# Patient Record
Sex: FEMALE | Race: WHITE | NOT HISPANIC OR LATINO | Employment: FULL TIME | URBAN - METROPOLITAN AREA
[De-identification: names, ages, dates, MRNs, and addresses within clinical notes are randomized per-mention and may not be internally consistent; named-entity substitution may affect disease eponyms.]

---

## 2023-05-04 LAB
CALCIDIOL (25 OH VITAMIN D3) (NG/ML) IN SER/PLAS: 37 NG/ML
ERYTHROCYTE DISTRIBUTION WIDTH (RATIO) BY AUTOMATED COUNT: 13.5 % (ref 11.5–14.5)
ERYTHROCYTE MEAN CORPUSCULAR HEMOGLOBIN CONCENTRATION (G/DL) BY AUTOMATED: 32.1 G/DL (ref 32–36)
ERYTHROCYTE MEAN CORPUSCULAR VOLUME (FL) BY AUTOMATED COUNT: 94 FL (ref 80–100)
ERYTHROCYTES (10*6/UL) IN BLOOD BY AUTOMATED COUNT: 4.03 X10E12/L (ref 4–5.2)
GLUCOSE, 1 HR SCREEN, PREG: 68 MG/DL
HEMATOCRIT (%) IN BLOOD BY AUTOMATED COUNT: 37.7 % (ref 36–46)
HEMOGLOBIN (G/DL) IN BLOOD: 12.1 G/DL (ref 12–16)
LEUKOCYTES (10*3/UL) IN BLOOD BY AUTOMATED COUNT: 10.5 X10E9/L (ref 4.4–11.3)
NRBC (PER 100 WBCS) BY AUTOMATED COUNT: 0 /100 WBC (ref 0–0)
PLATELETS (10*3/UL) IN BLOOD AUTOMATED COUNT: 209 X10E9/L (ref 150–450)
REFLEX ADDED, ANEMIA PANEL: NORMAL
THYROTROPIN (MIU/L) IN SER/PLAS BY DETECTION LIMIT <= 0.05 MIU/L: 0.78 MIU/L (ref 0.44–3.98)

## 2023-07-01 LAB — GROUP B STREP SCREEN: NORMAL

## 2023-11-09 PROBLEM — R01.1 MURMUR: Status: ACTIVE | Noted: 2023-11-09

## 2023-11-09 PROBLEM — D18.01 HEMANGIOMA OF SKIN AND SUBCUTANEOUS TISSUE: Status: ACTIVE | Noted: 2022-10-17

## 2023-11-09 PROBLEM — B00.1 COLD SORE: Status: ACTIVE | Noted: 2023-11-09

## 2023-11-09 PROBLEM — L70.0 ACNE VULGARIS: Status: ACTIVE | Noted: 2022-10-17

## 2023-11-09 PROBLEM — L81.4 OTHER MELANIN HYPERPIGMENTATION: Status: ACTIVE | Noted: 2022-10-17

## 2023-11-09 PROBLEM — D22.5 MELANOCYTIC NEVI OF TRUNK: Status: ACTIVE | Noted: 2022-10-17

## 2023-11-09 PROBLEM — L82.1 OTHER SEBORRHEIC KERATOSIS: Status: ACTIVE | Noted: 2022-10-17

## 2023-11-10 ENCOUNTER — DOCUMENTATION (OUTPATIENT)
Dept: PEDIATRIC ICU | Facility: HOSPITAL | Age: 28
End: 2023-11-10
Payer: COMMERCIAL

## 2023-11-15 ENCOUNTER — OFFICE VISIT (OUTPATIENT)
Dept: OBSTETRICS AND GYNECOLOGY | Facility: CLINIC | Age: 28
End: 2023-11-15
Payer: COMMERCIAL

## 2023-11-15 VITALS
BODY MASS INDEX: 30.82 KG/M2 | SYSTOLIC BLOOD PRESSURE: 126 MMHG | WEIGHT: 185 LBS | HEIGHT: 65 IN | DIASTOLIC BLOOD PRESSURE: 74 MMHG

## 2023-11-15 DIAGNOSIS — Z01.419 ENCOUNTER FOR GYNECOLOGICAL EXAMINATION WITHOUT ABNORMAL FINDING: Primary | ICD-10-CM

## 2023-11-15 PROCEDURE — 1036F TOBACCO NON-USER: CPT | Performed by: OBSTETRICS & GYNECOLOGY

## 2023-11-15 PROCEDURE — 99395 PREV VISIT EST AGE 18-39: CPT | Performed by: OBSTETRICS & GYNECOLOGY

## 2023-11-15 NOTE — PROGRESS NOTES
"Subjective   Sunitha Mckeon is a 28 y.o. female here for a routine exam. Current complaints: She is breast-feeding, she has had 2 menses.  They were slightly heavier.  There is no discharge, no change in bowel habits or dysuria.  She is sexually active and does occasionally notice a \"cramping\" at the cervical area.  She had a vaginal delivery boy on August 1, 2023 at [facility].. Personal health questionnaire reviewed: not asked.     Gynecologic History  Patient's last menstrual period was 11/06/2023 (exact date).  Contraception: condoms  Last Pap: 5/5/21. Results were: normal  Last mammogram: n/a. Results were:  n/a    Obstetric History  OB History   No obstetric history on file.       Objective   Constitutional: Alert and in no acute distress. Well developed, well nourished.   Head and Face: Head and face: Normal.    Eyes: Normal external exam - nonicteric sclera, extraocular movements intact (EOMI) and no ptosis.   Neck: No neck asymmetry. Supple. Thyroid not enlarged and there were no palpable thyroid nodules.    Pulmonary: No respiratory distress.   Chest: Breasts: Normal appearance, no nipple discharge and no skin changes. Palpation of breasts and axillae: No palpable mass and no axillary lymphadenopathy.   Abdomen: Soft nontender; no abdominal mass palpated. No organomegaly. No hernias.   Genitourinary: External genitalia: Normal. No inguinal lymphadenopathy. Bartholin's Urethral and Skenes Glands: Normal. Urethra: Normal.  Bladder: Normal on palpation. Vagina: Normal. Cervix: Normal.  No pap was sent. Uterus: Normal.  Right Adnexa/parametria: Normal.  Left Adnexa/parametria: Normal.  Inspection of Perianal Area: Normal.   Musculoskeletal: No joint swelling seen, normal movements of all extremities.   Skin: Normal skin color and pigmentation, normal skin turgor, and no rash.   Neurologic: Non-focal. Grossly intact.   Psychiatric: Alert and oriented x 3. Affect normal to patient baseline. Mood: Appropriate.  " Physical Exam     Assessment/Plan   Healthy female exam.  This is a 28-year-old female with a normal exam.  No Pap was sent, she had a normal Pap in 2021.  We discussed options for contraception in the setting of breast-feeding.  She will continue to use over-the-counter contraceptive gels.  We discussed options such as progestin only pills or IUD.  I will see her routinely in 1 year.  Education reviewed: self breast exams.

## 2024-08-20 ENCOUNTER — APPOINTMENT (OUTPATIENT)
Dept: DERMATOLOGY | Facility: CLINIC | Age: 29
End: 2024-08-20
Payer: COMMERCIAL

## 2024-08-20 DIAGNOSIS — D22.9 NEVUS: ICD-10-CM

## 2024-08-20 DIAGNOSIS — Z12.83 SKIN CANCER SCREENING: Primary | ICD-10-CM

## 2024-08-20 DIAGNOSIS — L70.0 ACNE VULGARIS: ICD-10-CM

## 2024-08-20 DIAGNOSIS — L81.4 LENTIGO: ICD-10-CM

## 2024-08-20 DIAGNOSIS — L23.89 OTHER ALLERGIC CONTACT DERMATITIS: ICD-10-CM

## 2024-08-20 PROCEDURE — 1036F TOBACCO NON-USER: CPT | Performed by: NURSE PRACTITIONER

## 2024-08-20 PROCEDURE — 99214 OFFICE O/P EST MOD 30 MIN: CPT | Performed by: NURSE PRACTITIONER

## 2024-08-20 RX ORDER — TRETINOIN 0.5 MG/G
CREAM TOPICAL NIGHTLY
Qty: 20 G | Refills: 1 | Status: SHIPPED | OUTPATIENT
Start: 2024-08-20 | End: 2025-08-20

## 2024-08-20 NOTE — PROGRESS NOTES
Subjective     Sunitha Mckeon is a 28 y.o. female who presents for the following: Skin Check and Acne.   Established patient in for annual full-body skin check as well as annual follow-up for acne patient was last prescribed tretinoin 0.05% cream.    Review of Systems:  No other skin or systemic complaints other than what is documented elsewhere in the note.    The following portions of the chart were reviewed this encounter and updated as appropriate:       Skin Cancer History  No skin cancer on file.    Specialty Problems          Dermatology Problems    Acne vulgaris    Hemangioma of skin and subcutaneous tissue    Melanocytic nevi of trunk    Other melanin hyperpigmentation    Other seborrheic keratosis     Past Medical History:  Sunitha Mckeon  has no past medical history on file.    Past Surgical History:  Sunitha Mckeon  has no past surgical history on file.    Family History:  Patient family history is not on file.    Social History:  Sunitha Mckeon  reports that she has never smoked. She has never used smokeless tobacco. She reports that she does not currently use alcohol. She reports that she does not use drugs.    Allergies:  Patient has no known allergies.    Current Medications / CAM's:    Current Outpatient Medications:     prenatal vit 91/iron/folic/dha (PRENATAL + DHA ORAL), Take by mouth., Disp: , Rfl:     tretinoin (Retin-A) 0.05 % cream, Apply topically once daily at bedtime. Apply a pea size amount nightly to entire face. Moisturize as needed, Disp: 20 g, Rfl: 1     Objective   Well appearing patient in no apparent distress; mood and affect are within normal limits.      Assessment/Plan   1. Skin cancer screening    The patient presented for a routine skin examination today. There are no specific concerns regarding skin health and no new or changing moles, lesions, or rashes.     Assessment: Based on the comprehensive skin examination, there were no concerning or abnormal  findings. The patient's skin appeared to be in good health, without any notable dermatologic conditions or lesions.    Plan: Given the absence of any significant skin findings, no specific interventions or treatments are warranted at this time. The patient was educated on the importance of regular skin self-examinations and advised to promptly report any changes or concerns. Routine follow-up for a skin examination was recommended.    -These lesions have benign, reassuring patterns on dermoscopy.  -There were no concerning features found on exam today.  -Recommend continued self-observation, and to contact the office if any changes in nevi are  noticed.    Discussed/information given on safe sun practices and use of sunscreen, sun protective clothing or sun avoidance. Recommend to use OTC medication of sunscreen SPF 30 or higher on a daily basis prior to sun exposure to reduce the risk of skin cancer.    Contact Office if: Any lesions change in size, shape or color; itch, bum or bleed.         2. Nevus  Multiple benign appearing flesh colored to pigmented macules and papules     Plan: Counseling.  I counseled the patient regarding the following:  Instructions: Monthly self-skin checks to monitor for any changes in moles are recommended. Expectations: Benign Nevi are pigmented nests of cells within the skin.No treatment is necessary. Contact Office if: Any moles change in size, shape or color; itch, bum or bleed.    3. Lentigo  Scattered tan macules in sun-exposed areas.    Solar lentigo (a type of lentigo also known as a senile lentigo, age spot, or liver spot) is a benign pigmented macule appearing on fair-skinned individuals that is related to ultraviolet radiation (UVR) exposure, typically from the sun.     PLAN:  Limiting sun exposure through avoidance, protective clothing, and use of sunscreens can help prevent the appearance of solar lentigines.    If lesion changes or becomes symptomatic she should return to  clinic    4. Acne vulgaris  Scattered comedones and inflammatory papulopustules.    Maintenance of Current Regimen: Please continue using the prescribed topical medications as directed. Consistency is key to maintaining the improvement achieved so far.    PLAN:  Restart tretinoin 0.05% cream nightly   Patient was very happy with regimen prior to her pregnancy      Further Treatment Options: If there are no significant improvements or if your acne worsens, we may consider additional treatment options such as oral medications or in-office procedures. However, it is important to note that most cases of mild acne can be managed effectively with the current regimen.    Skin Care Tips: Continue following a gentle cleansing routine, avoiding harsh scrubbing, and using non-comedogenic moisturizers to keep your skin hydrated without clogging pores.    Cleansing Routine: Gentle Cleanser: You have been using a mild, non-comedogenic cleanser to wash your face twice daily. This practice helps to remove excess oil, dirt, and impurities from your skin, minimizing the risk of pore blockage.    Lifestyle Measures: Avoiding Trigger Factors: We also discussed avoiding known triggers such as excessive sun exposure, touching or picking at the acne lesions, and using heavy or comedogenic cosmetic products.    Please feel free to contact our clinic if you have any questions or concerns between appointments. Remember, acne treatment requires patience and consistency.                 tretinoin (Retin-A) 0.05 % cream  Apply topically once daily at bedtime. Apply a pea size amount nightly to entire face. Moisturize as needed    5. Other allergic contact dermatitis  Left Proximal 4th Finger  Eczematous, blistering dermatitis when patient wears her wedding ring.  She was treated with clotrimazole/betamethasone with good results    PLAN:  Discussed the nature of contact dermatitis and that exposure can reproduce rash  Pictures on patients phone  support the diagnosis, but nothing active on physical exam  Reassurance this does not appear fungal

## 2024-09-12 ENCOUNTER — APPOINTMENT (OUTPATIENT)
Dept: PRIMARY CARE | Facility: CLINIC | Age: 29
End: 2024-09-12
Payer: COMMERCIAL

## 2024-09-12 VITALS
BODY MASS INDEX: 29.82 KG/M2 | TEMPERATURE: 96.9 F | HEART RATE: 66 BPM | WEIGHT: 179 LBS | SYSTOLIC BLOOD PRESSURE: 120 MMHG | HEIGHT: 65 IN | OXYGEN SATURATION: 98 % | DIASTOLIC BLOOD PRESSURE: 64 MMHG

## 2024-09-12 DIAGNOSIS — Z00.00 ANNUAL PHYSICAL EXAM: Primary | ICD-10-CM

## 2024-09-12 PROCEDURE — 99385 PREV VISIT NEW AGE 18-39: CPT | Performed by: NURSE PRACTITIONER

## 2024-09-12 PROCEDURE — 1036F TOBACCO NON-USER: CPT | Performed by: NURSE PRACTITIONER

## 2024-09-12 PROCEDURE — 3008F BODY MASS INDEX DOCD: CPT | Performed by: NURSE PRACTITIONER

## 2024-09-12 RX ORDER — CLOTRIMAZOLE AND BETAMETHASONE DIPROPIONATE 10; .64 MG/G; MG/G
CREAM TOPICAL
COMMUNITY
Start: 2024-07-01

## 2024-09-12 ASSESSMENT — ENCOUNTER SYMPTOMS
DEPRESSION: 0
OCCASIONAL FEELINGS OF UNSTEADINESS: 0
LOSS OF SENSATION IN FEET: 0

## 2024-09-12 ASSESSMENT — PATIENT HEALTH QUESTIONNAIRE - PHQ9
1. LITTLE INTEREST OR PLEASURE IN DOING THINGS: NOT AT ALL
SUM OF ALL RESPONSES TO PHQ9 QUESTIONS 1 AND 2: 0
2. FEELING DOWN, DEPRESSED OR HOPELESS: NOT AT ALL

## 2024-09-12 ASSESSMENT — PAIN SCALES - GENERAL: PAINLEVEL: 0-NO PAIN

## 2024-09-12 NOTE — PATIENT INSTRUCTIONS
Return for Fasting Labs  Nothing to eat or drink for 10 hours. Black coffee, black tea or water is ok    Health Tips for People in their 20's     Develop good health habits now  Don't put it off. With good health habits, you can prevent or reduce the likelihood of health-impacting conditions later in life, such as obesity, high blood pressure, heart disease, or diabetes. Establishing good health habits now is easier than having to begin these practices later on, or to have to break bad habits.      Establish a relationship with a primary care provider   A PCP can help you on your health journey. When you see a provider on a regular basis, you're more likely to feel comfortable about talking about sensitive issues as well as being receptive to the advice your provider offers, because you develop a trusting relationship. And by getting to know you over time, your doctor may be better able to  on signs of health concerns.      Know your family health history   Knowing your family's health history can help you and your primary care provider better manage your health - and be aware of potential hereditary risks to be watching for.  Things like migraines or even family members with certain cancers and heart attack can increase your risk.       Get regular health screenings and Keep up with immunizations. This includes the HPV vaccine, for men and women.   For women: Monthly Self breast exams, See Gynecology for a Pap smear; HPV screening for the virus that causes genital warts, which can lead to cervical cancer   For men: Monthly Self testicular exams.   For both: sexually transmitted disease testing, if sexually active. Remember to use birth control to prevent and to protect. Talk with your health care provider about the screening schedule that's best for you.    Have good for you people in your life  Its all about a few good friends over a lot of not so good friends. If you are close to your family stay that way,  if not then develop new relationships that help you to grow and thrive. Often people make friends at work, [employer], community groups  Developing and maintaining a work-life balance that allows room for friendships and relationships can have a positive impact on your mental and emotional health.     Be a numbers person  Keep tabs on numbers that affect your health, like weight, blood pressure, the amount of calories you consume, and cholesterol. Your health care provider can help you with this.      Pay attention to the risk of a few extra pounds.  If you gain four or five pounds every year, it doesn't seem like a lot, but at the end of 10 years, you've added 40 or 50 pounds - and in 15 to 20 years, you have 75 to 100 extra pounds that you're carrying!  *Choose a life of healthful eating over trendy diets. The more effective approach: adopt a life-time practice of eating a balanced, nutritional diet that includes vegetables, fruits, lean meats, whole grains, low-fat dairy, nuts and legumes, and non-tropical vegetable oils. And limit sweets.   *Practice portion control. There's more to healthy eating than choosing nutritious food. There's also limiting how much you eat, even if you're eating incredibly healthy food *Remember, your metabolic rate slows as you age. That is, your body becomes less efficient in burning calories.     Stay active   If you're exercising on a regular basis, that is going to help with a lot of health problems that are related to lifestyle.  You don't have to be an athlete, start with a walking program. Even 10 minutes of good exercise is beneficial. Don't forget weight training. Any Weights 3 days a week maintains bone health and helps to burn calories    Get enough sleep  For most that means seven to nine hours a night.   Sleep affects your ability to learn new information and to memorize and process information. Reaction time is adversely affected by too little sleep (a big safety risk  similar to drinking alcohol). In the long run, sleep makes a big difference in how you function and succeed     Mood impacts your overall health  People who are struggling with depression, anxiety, and self-esteem issues really have a lot of difficulty with their health. When depressed, you may not be motivated and may not see the value of taking care of your health. Self Care, Exercise and friendships can help reduce your risk of mental and emotional health issues, and when you need it, your health care provider can help you get professional help.      Don't vape  Vaping is a big problem ,it's not just flavored water, nicotine comes from tobacco so you are in essence still smoking. Also, we don't know about the effects of what's in vaping cartridges, and sometimes they're modified with substances that can cause lung failure. Cigarettes are even worse with known cancer causing chemicals  2 ml of vape juice is equal to 1 pack of cigarettes    Think twice about marijuana  Even in states where marijuana is legal (medically or recreationally) it doesn't mean your employer is going to think it's OK.   Like alcohol, marijuana affects your reasoning, decision-making, and ability to operate equipment safely

## 2024-09-12 NOTE — PROGRESS NOTES
"Subjective   Patient ID: Sunitha Mckeon is a 28 y.o. female who presents for New Patient Visit, Annual Exam, and Dry Eye.    HPI  Pleasant 27 y/o female with PMH Heart Murmur who presents to [facility] care and for Annual  No PCP    Current concerns  Dry eye- over the last year, denies allergy, itchy watery eyes, ears, PND. Was evaluated by Optho who recommended lubricating drops.      Works as RN rotating nights at [employer]  2 Kids ages 1 and 2 1/2    Diet recently started eating healthier, counting calories  Caffeine 2  Water 64 oz  Exercise walks     Non-smoker  Alcohol Social       Eye exam 2024  Dental exam 2022, scheduled  Gyn no abnormal pap 2023    Family history non contributory      Review of Systems  Review of Systems   Constitutional: Negative.    HENT: Negative.     Respiratory: Negative.     Cardiovascular: Negative.    Gastrointestinal: Negative.    Genitourinary: Negative.    Musculoskeletal: Negative.    Psychiatric/Behavioral: Negative.     All other systems reviewed and are negative.    .vsVisit Vitals  /64 (BP Location: Left arm, Patient Position: Sitting)   Pulse 66   Temp 36.1 °C (96.9 °F)   Ht 1.651 m (5' 5\")   Wt 81.2 kg (179 lb)   LMP 09/01/2024   SpO2 98%   BMI 29.79 kg/m²   OB Status Having periods   Smoking Status Never   BSA 1.93 m²         Objective   Physical Exam  Vitals reviewed.   Constitutional:       Appearance: Normal appearance.   HENT:      Head: Normocephalic and atraumatic.      Right Ear: Tympanic membrane and ear canal normal.      Left Ear: Tympanic membrane and ear canal normal.      Nose: Rhinorrhea present.      Mouth/Throat:      Pharynx: Oropharynx is clear.   Eyes:      General:         Right eye: No discharge.         Left eye: No discharge.      Extraocular Movements: Extraocular movements intact.      Conjunctiva/sclera: Conjunctivae normal.      Pupils: Pupils are equal, round, and reactive to light.   Cardiovascular:      Rate and Rhythm: Normal " rate and regular rhythm.      Pulses: Normal pulses.      Heart sounds: Normal heart sounds.   Pulmonary:      Effort: Pulmonary effort is normal.      Breath sounds: Normal breath sounds. No wheezing.   Abdominal:      General: Bowel sounds are normal. There is no distension.      Palpations: Abdomen is soft.      Tenderness: There is no abdominal tenderness.   Musculoskeletal:         General: Normal range of motion.      Cervical back: Normal range of motion and neck supple.   Skin:     General: Skin is warm.      Capillary Refill: Capillary refill takes 2 to 3 seconds.   Neurological:      General: No focal deficit present.      Mental Status: She is alert.   Psychiatric:         Mood and Affect: Mood normal.         Assessment/Plan   Problem List Items Addressed This Visit       Annual physical exam - Primary     Plate method, Nutritionist discussed           Relevant Orders    CBC    Comprehensive Metabolic Panel    Vitamin D 25-Hydroxy,Total (for eval of Vitamin D levels)    TSH with reflex to Free T4 if abnormal    Hemoglobin A1C    Lipid Panel

## 2024-09-13 ENCOUNTER — LAB (OUTPATIENT)
Dept: LAB | Facility: LAB | Age: 29
End: 2024-09-13
Payer: COMMERCIAL

## 2024-09-13 LAB
ERYTHROCYTE [DISTWIDTH] IN BLOOD BY AUTOMATED COUNT: 12.3 % (ref 11.5–14.5)
EST. AVERAGE GLUCOSE BLD GHB EST-MCNC: 97 MG/DL
HBA1C MFR BLD: 5 %
HCT VFR BLD AUTO: 38.9 % (ref 36–46)
HGB BLD-MCNC: 12.7 G/DL (ref 12–16)
MCH RBC QN AUTO: 30.2 PG (ref 26–34)
MCHC RBC AUTO-ENTMCNC: 32.6 G/DL (ref 32–36)
MCV RBC AUTO: 92 FL (ref 80–100)
NRBC BLD-RTO: 0 /100 WBCS (ref 0–0)
PLATELET # BLD AUTO: 224 X10*3/UL (ref 150–450)
RBC # BLD AUTO: 4.21 X10*6/UL (ref 4–5.2)
WBC # BLD AUTO: 7.8 X10*3/UL (ref 4.4–11.3)

## 2024-09-13 PROCEDURE — 82306 VITAMIN D 25 HYDROXY: CPT

## 2024-09-13 PROCEDURE — 80061 LIPID PANEL: CPT

## 2024-09-13 PROCEDURE — 84443 ASSAY THYROID STIM HORMONE: CPT

## 2024-09-13 PROCEDURE — 80053 COMPREHEN METABOLIC PANEL: CPT

## 2024-09-13 PROCEDURE — 85027 COMPLETE CBC AUTOMATED: CPT

## 2024-09-13 PROCEDURE — 83036 HEMOGLOBIN GLYCOSYLATED A1C: CPT

## 2024-09-14 LAB
25(OH)D3 SERPL-MCNC: 34 NG/ML (ref 30–100)
ALBUMIN SERPL BCP-MCNC: 4.5 G/DL (ref 3.4–5)
ALP SERPL-CCNC: 52 U/L (ref 33–110)
ALT SERPL W P-5'-P-CCNC: 11 U/L (ref 7–45)
ANION GAP SERPL CALC-SCNC: 13 MMOL/L (ref 10–20)
AST SERPL W P-5'-P-CCNC: 11 U/L (ref 9–39)
BILIRUB SERPL-MCNC: 0.8 MG/DL (ref 0–1.2)
BUN SERPL-MCNC: 11 MG/DL (ref 6–23)
CALCIUM SERPL-MCNC: 9.3 MG/DL (ref 8.6–10.6)
CHLORIDE SERPL-SCNC: 102 MMOL/L (ref 98–107)
CHOLEST SERPL-MCNC: 147 MG/DL (ref 0–199)
CHOLESTEROL/HDL RATIO: 2.9
CO2 SERPL-SCNC: 25 MMOL/L (ref 21–32)
CREAT SERPL-MCNC: 0.83 MG/DL (ref 0.5–1.05)
EGFRCR SERPLBLD CKD-EPI 2021: >90 ML/MIN/1.73M*2
GLUCOSE SERPL-MCNC: 88 MG/DL (ref 74–99)
HDLC SERPL-MCNC: 50.1 MG/DL
LDLC SERPL CALC-MCNC: 78 MG/DL
NON HDL CHOLESTEROL: 97 MG/DL (ref 0–149)
POTASSIUM SERPL-SCNC: 4 MMOL/L (ref 3.5–5.3)
PROT SERPL-MCNC: 7 G/DL (ref 6.4–8.2)
SODIUM SERPL-SCNC: 136 MMOL/L (ref 136–145)
TRIGL SERPL-MCNC: 93 MG/DL (ref 0–149)
TSH SERPL-ACNC: 1 MIU/L (ref 0.44–3.98)
VLDL: 19 MG/DL (ref 0–40)

## 2024-10-09 ENCOUNTER — LAB (OUTPATIENT)
Dept: LAB | Facility: LAB | Age: 29
End: 2024-10-09
Payer: COMMERCIAL

## 2024-10-09 ENCOUNTER — TELEPHONE (OUTPATIENT)
Dept: OBSTETRICS AND GYNECOLOGY | Facility: CLINIC | Age: 29
End: 2024-10-09
Payer: COMMERCIAL

## 2024-10-09 DIAGNOSIS — R39.9 UTI SYMPTOMS: Primary | ICD-10-CM

## 2024-10-09 DIAGNOSIS — R39.9 UTI SYMPTOMS: ICD-10-CM

## 2024-10-09 LAB
APPEARANCE UR: CLEAR
BILIRUB UR STRIP.AUTO-MCNC: NEGATIVE MG/DL
COLOR UR: YELLOW
GLUCOSE UR STRIP.AUTO-MCNC: NORMAL MG/DL
KETONES UR STRIP.AUTO-MCNC: NEGATIVE MG/DL
LEUKOCYTE ESTERASE UR QL STRIP.AUTO: ABNORMAL
MUCOUS THREADS #/AREA URNS AUTO: ABNORMAL /LPF
NITRITE UR QL STRIP.AUTO: NEGATIVE
PH UR STRIP.AUTO: 6.5 [PH]
PROT UR STRIP.AUTO-MCNC: NEGATIVE MG/DL
RBC # UR STRIP.AUTO: ABNORMAL /UL
RBC #/AREA URNS AUTO: ABNORMAL /HPF
SP GR UR STRIP.AUTO: 1.01
SQUAMOUS #/AREA URNS AUTO: ABNORMAL /HPF
UROBILINOGEN UR STRIP.AUTO-MCNC: NORMAL MG/DL
WBC #/AREA URNS AUTO: ABNORMAL /HPF

## 2024-10-09 PROCEDURE — 87086 URINE CULTURE/COLONY COUNT: CPT

## 2024-10-09 PROCEDURE — 81001 URINALYSIS AUTO W/SCOPE: CPT

## 2024-10-09 RX ORDER — NITROFURANTOIN 25; 75 MG/1; MG/1
100 CAPSULE ORAL 2 TIMES DAILY
Qty: 14 CAPSULE | Refills: 0 | Status: SHIPPED | OUTPATIENT
Start: 2024-10-09 | End: 2024-10-16

## 2024-10-09 NOTE — RESULT ENCOUNTER NOTE
The initial urine dip shows white blood cells and a trace of blood.  Although this is quite common in menstruating woman, it could be a sign of a UTI.  I have already ordered nitrofurantoin to your pharmacy earlier today.  I will contact you when the urine culture is finalized if there is a change in management needed.

## 2024-10-09 NOTE — TELEPHONE ENCOUNTER
Pt going now for urine tests. Will start rx right after. She is aware that we will contact her with any changes that may occur.     Leeanna Hunter MA

## 2024-10-09 NOTE — TELEPHONE ENCOUNTER
Pt calling with UTI sx: burning with urination for a week now. I put orders in for a urinalysis as well as a urine culture.     Leeanna Hunter MA

## 2024-10-09 NOTE — TELEPHONE ENCOUNTER
The patient called with UTI symptoms of burning and urgency.  I did order nitrofurantoin empirically to her pharmacy, but I do recommend getting a urine culture at the lab before starting the antibiotic.  I will contact her if there is a change in management.

## 2024-10-10 LAB — BACTERIA UR CULT: NORMAL

## 2024-12-11 ENCOUNTER — APPOINTMENT (OUTPATIENT)
Dept: OBSTETRICS AND GYNECOLOGY | Facility: CLINIC | Age: 29
End: 2024-12-11
Payer: COMMERCIAL

## 2024-12-11 VITALS
BODY MASS INDEX: 28.12 KG/M2 | DIASTOLIC BLOOD PRESSURE: 70 MMHG | WEIGHT: 175 LBS | HEIGHT: 66 IN | SYSTOLIC BLOOD PRESSURE: 116 MMHG

## 2024-12-11 DIAGNOSIS — Z01.419 ENCOUNTER FOR GYNECOLOGICAL EXAMINATION WITHOUT ABNORMAL FINDING: Primary | ICD-10-CM

## 2024-12-11 DIAGNOSIS — R10.2 FEMALE PELVIC PAIN: ICD-10-CM

## 2024-12-11 PROCEDURE — 88175 CYTOPATH C/V AUTO FLUID REDO: CPT

## 2024-12-11 PROCEDURE — 99395 PREV VISIT EST AGE 18-39: CPT | Performed by: OBSTETRICS & GYNECOLOGY

## 2024-12-11 PROCEDURE — 1036F TOBACCO NON-USER: CPT | Performed by: OBSTETRICS & GYNECOLOGY

## 2024-12-11 PROCEDURE — 3008F BODY MASS INDEX DOCD: CPT | Performed by: OBSTETRICS & GYNECOLOGY

## 2024-12-11 NOTE — PROGRESS NOTES
Subjective   Sunitha Mckeon is a 29 y.o. female here for a routine exam.  She has regular cycles over this last year.  Her last menses was December 10, 2024 she has light bleeding today.    There is no discharge, no dysuria, no change in bowel habits.  She did have UTI symptoms  and is status post nitrofurantoin.  No current urinary concerns.    She had a vaginal delivery on 2023 and has had some discomfort with intercourse since.    Personal health questionnaire reviewed: yes.     Gynecologic History  Patient's last menstrual period was 12/10/2024 (exact date).  Contraception: none  Last Pap: 21. Results were: normal      Obstetric History  OB History    Para Term  AB Living   2 2       2   SAB IAB Ectopic Multiple Live Births           2      # Outcome Date GA Lbr Nolberto/2nd Weight Sex Type Anes PTL Lv   2 Para            1 Para                Objective   Constitutional: Alert and in no acute distress. Well developed, well nourished.   Head and Face: Head and face: Normal.    Eyes: Normal external exam - nonicteric sclera, extraocular movements intact (EOMI) and no ptosis.   Neck: No neck asymmetry. Supple. Thyroid not enlarged and there were no palpable thyroid nodules.    Pulmonary: No respiratory distress.   Chest: Breasts: Normal appearance, no nipple discharge and no skin changes. Palpation of breasts and axillae: No palpable mass and no axillary lymphadenopathy.   Abdomen: Soft nontender; no abdominal mass palpated. No organomegaly. No hernias.   Genitourinary: External genitalia: Normal. No inguinal lymphadenopathy. Bartholin's Urethral and Skenes Glands: Normal. Urethra: Normal.  Bladder: Normal on palpation. Vagina: Normal. Cervix: Normal.  Uterus: Normal.  Right Adnexa/parametria: Normal.  Left Adnexa/parametria: Normal.  Inspection of Perianal Area: Normal.   Musculoskeletal: No joint swelling seen, normal movements of all extremities.   Skin: Normal skin color and  pigmentation, normal skin turgor, and no rash.   Neurologic: Non-focal. Grossly intact.   Psychiatric: Alert and oriented x 3. Affect normal to patient baseline. Mood: Appropriate.  Physical Exam     Assessment/Plan   Healthy female exam.  This is a 29-year-old female with a normal exam.  A Pap smear was sent.    We discussed discomfort with intercourse since delivery in August 2023.  I did recommend considering a lubricant such as K-Y jelly, Mateo glide or Uber lube.  I did place a referral for pelvic floor physical therapy.    She currently declines contraception, I do recommend taking a multivitamin or prenatal vitamin with folic acid.    I will see her routinely in 1 year.

## 2024-12-20 DIAGNOSIS — O21.9 NAUSEA AND VOMITING IN PREGNANCY PRIOR TO 22 WEEKS GESTATION: Primary | ICD-10-CM

## 2024-12-20 RX ORDER — ONDANSETRON 4 MG/1
4 TABLET, FILM COATED ORAL EVERY 8 HOURS PRN
Qty: 30 TABLET | Refills: 2 | Status: SHIPPED | OUTPATIENT
Start: 2024-12-20 | End: 2025-02-18

## 2024-12-27 LAB
CYTOLOGY CMNT CVX/VAG CYTO-IMP: NORMAL
LAB AP HPV GENOTYPE QUESTION: YES
LAB AP HPV HR: NORMAL
LABORATORY COMMENT REPORT: NORMAL
LMP START DATE: NORMAL
PATH REPORT.TOTAL CANCER: NORMAL

## 2025-02-12 ENCOUNTER — OFFICE VISIT (OUTPATIENT)
Dept: URGENT CARE | Age: 30
End: 2025-02-12
Payer: COMMERCIAL

## 2025-02-12 ENCOUNTER — APPOINTMENT (OUTPATIENT)
Dept: RADIOLOGY | Facility: HOSPITAL | Age: 30
End: 2025-02-12
Payer: COMMERCIAL

## 2025-02-12 ENCOUNTER — APPOINTMENT (OUTPATIENT)
Dept: CARDIOLOGY | Facility: HOSPITAL | Age: 30
End: 2025-02-12
Payer: COMMERCIAL

## 2025-02-12 ENCOUNTER — HOSPITAL ENCOUNTER (EMERGENCY)
Facility: HOSPITAL | Age: 30
Discharge: HOME | End: 2025-02-12
Payer: COMMERCIAL

## 2025-02-12 VITALS
RESPIRATION RATE: 16 BRPM | DIASTOLIC BLOOD PRESSURE: 71 MMHG | WEIGHT: 175 LBS | OXYGEN SATURATION: 100 % | SYSTOLIC BLOOD PRESSURE: 119 MMHG | HEART RATE: 68 BPM | BODY MASS INDEX: 28.25 KG/M2

## 2025-02-12 VITALS
OXYGEN SATURATION: 98 % | RESPIRATION RATE: 14 BRPM | DIASTOLIC BLOOD PRESSURE: 89 MMHG | HEART RATE: 71 BPM | SYSTOLIC BLOOD PRESSURE: 131 MMHG | TEMPERATURE: 98.8 F

## 2025-02-12 DIAGNOSIS — R94.31 EKG ABNORMALITY: Primary | ICD-10-CM

## 2025-02-12 DIAGNOSIS — R07.89 CHEST TIGHTNESS: Primary | ICD-10-CM

## 2025-02-12 LAB
ALBUMIN SERPL BCP-MCNC: 5.1 G/DL (ref 3.4–5)
ALP SERPL-CCNC: 62 U/L (ref 33–110)
ALT SERPL W P-5'-P-CCNC: 15 U/L (ref 7–45)
ANION GAP SERPL CALC-SCNC: 13 MMOL/L (ref 10–20)
AST SERPL W P-5'-P-CCNC: 12 U/L (ref 9–39)
BASOPHILS # BLD AUTO: 0.03 X10*3/UL (ref 0–0.1)
BASOPHILS NFR BLD AUTO: 0.3 %
BILIRUB SERPL-MCNC: 0.8 MG/DL (ref 0–1.2)
BUN SERPL-MCNC: 11 MG/DL (ref 6–23)
CALCIUM SERPL-MCNC: 9.9 MG/DL (ref 8.6–10.3)
CARDIAC TROPONIN I PNL SERPL HS: <3 NG/L (ref 0–13)
CARDIAC TROPONIN I PNL SERPL HS: <3 NG/L (ref 0–13)
CHLORIDE SERPL-SCNC: 104 MMOL/L (ref 98–107)
CO2 SERPL-SCNC: 25 MMOL/L (ref 21–32)
CREAT SERPL-MCNC: 0.81 MG/DL (ref 0.5–1.05)
EGFRCR SERPLBLD CKD-EPI 2021: >90 ML/MIN/1.73M*2
EOSINOPHIL # BLD AUTO: 0.1 X10*3/UL (ref 0–0.7)
EOSINOPHIL NFR BLD AUTO: 1 %
ERYTHROCYTE [DISTWIDTH] IN BLOOD BY AUTOMATED COUNT: 12.2 % (ref 11.5–14.5)
GLUCOSE SERPL-MCNC: 94 MG/DL (ref 74–99)
HCT VFR BLD AUTO: 43.1 % (ref 36–46)
HGB BLD-MCNC: 14.3 G/DL (ref 12–16)
IMM GRANULOCYTES # BLD AUTO: 0.04 X10*3/UL (ref 0–0.7)
IMM GRANULOCYTES NFR BLD AUTO: 0.4 % (ref 0–0.9)
LYMPHOCYTES # BLD AUTO: 2.26 X10*3/UL (ref 1.2–4.8)
LYMPHOCYTES NFR BLD AUTO: 22.8 %
MAGNESIUM SERPL-MCNC: 2.05 MG/DL (ref 1.6–2.4)
MCH RBC QN AUTO: 30.6 PG (ref 26–34)
MCHC RBC AUTO-ENTMCNC: 33.2 G/DL (ref 32–36)
MCV RBC AUTO: 92 FL (ref 80–100)
MONOCYTES # BLD AUTO: 0.51 X10*3/UL (ref 0.1–1)
MONOCYTES NFR BLD AUTO: 5.1 %
NEUTROPHILS # BLD AUTO: 6.97 X10*3/UL (ref 1.2–7.7)
NEUTROPHILS NFR BLD AUTO: 70.4 %
NRBC BLD-RTO: 0 /100 WBCS (ref 0–0)
PLATELET # BLD AUTO: 266 X10*3/UL (ref 150–450)
POTASSIUM SERPL-SCNC: 3.6 MMOL/L (ref 3.5–5.3)
PROT SERPL-MCNC: 8.4 G/DL (ref 6.4–8.2)
RBC # BLD AUTO: 4.67 X10*6/UL (ref 4–5.2)
SODIUM SERPL-SCNC: 138 MMOL/L (ref 136–145)
WBC # BLD AUTO: 9.9 X10*3/UL (ref 4.4–11.3)

## 2025-02-12 PROCEDURE — 36415 COLL VENOUS BLD VENIPUNCTURE: CPT | Performed by: NURSE PRACTITIONER

## 2025-02-12 PROCEDURE — 85025 COMPLETE CBC W/AUTO DIFF WBC: CPT | Performed by: NURSE PRACTITIONER

## 2025-02-12 PROCEDURE — 80053 COMPREHEN METABOLIC PANEL: CPT | Performed by: NURSE PRACTITIONER

## 2025-02-12 PROCEDURE — 99285 EMERGENCY DEPT VISIT HI MDM: CPT | Mod: 25

## 2025-02-12 PROCEDURE — 83735 ASSAY OF MAGNESIUM: CPT | Performed by: NURSE PRACTITIONER

## 2025-02-12 PROCEDURE — 93005 ELECTROCARDIOGRAM TRACING: CPT

## 2025-02-12 PROCEDURE — 71046 X-RAY EXAM CHEST 2 VIEWS: CPT

## 2025-02-12 PROCEDURE — 84484 ASSAY OF TROPONIN QUANT: CPT | Performed by: NURSE PRACTITIONER

## 2025-02-12 PROCEDURE — 71046 X-RAY EXAM CHEST 2 VIEWS: CPT | Performed by: RADIOLOGY

## 2025-02-12 ASSESSMENT — HEART SCORE
HEART SCORE: 1
ECG: NON-SPECIFIC REPOLARIZATION DISTURBANCE
TROPONIN: LESS THAN OR EQUAL TO NORMAL LIMIT
RISK FACTORS: NO KNOWN RISK FACTORS
AGE: <45
HISTORY: SLIGHTLY SUSPICIOUS

## 2025-02-12 ASSESSMENT — ENCOUNTER SYMPTOMS
NAUSEA: 0
DIZZINESS: 0
VOMITING: 0
WEAKNESS: 0
SHORTNESS OF BREATH: 0
LIGHT-HEADEDNESS: 0

## 2025-02-12 ASSESSMENT — COLUMBIA-SUICIDE SEVERITY RATING SCALE - C-SSRS
2. HAVE YOU ACTUALLY HAD ANY THOUGHTS OF KILLING YOURSELF?: NO
6. HAVE YOU EVER DONE ANYTHING, STARTED TO DO ANYTHING, OR PREPARED TO DO ANYTHING TO END YOUR LIFE?: NO
1. IN THE PAST MONTH, HAVE YOU WISHED YOU WERE DEAD OR WISHED YOU COULD GO TO SLEEP AND NOT WAKE UP?: NO

## 2025-02-12 NOTE — ED PROVIDER NOTES
HPI   Chief Complaint   Patient presents with    Abnormal ECG     Pt went to urgent care earlier due to be anxious and feeling like something was wrong. Pt received and EKG and was told to come due to abnormal EKG. Pt has known heart murmur. Denies chest pain or sob. Pt is anxious in triage.        Patient is a healthy nontoxic-appearing 29-year-old male past medical history of meningioma, cardiac murmur, cigarette keratosis, presents emergency room today for complaint of abnormal EKG.  Patient states she went to urgent care earlier today due to being anxious.  Patient states EKG was performed at the time that was abnormal and was told to go to emergency room.  Patient states this has increased her anxiety however she denies any chest pain, shortness of breath difficulty breathing, palpitations, lightheadedness or dizziness, syncopal nursing events, abdominal pain, nausea, diarrhea or constipation, fever, shaking, or chills.  Patient states she is a known cardiac murmur however has never had an EKG previously.              Patient History   History reviewed. No pertinent past medical history.  History reviewed. No pertinent surgical history.  No family history on file.  Social History     Tobacco Use    Smoking status: Never    Smokeless tobacco: Never   Vaping Use    Vaping status: Never Used   Substance Use Topics    Alcohol use: Yes     Comment: ocasionally    Drug use: Never       Physical Exam   ED Triage Vitals   Temp Pulse Resp BP   -- -- -- --      SpO2 Temp src Heart Rate Source Patient Position   -- -- -- --      BP Location FiO2 (%)     -- --       Physical Exam  Vitals and nursing note reviewed. Exam conducted with a chaperone present.   Constitutional:       General: She is not in acute distress.     Appearance: Normal appearance. She is not ill-appearing, toxic-appearing or diaphoretic.      Interventions: She is not intubated.  HENT:      Head: Normocephalic.      Nose: Nose normal.      Mouth/Throat:       Mouth: Mucous membranes are moist.      Pharynx: No oropharyngeal exudate or posterior oropharyngeal erythema.   Eyes:      General:         Right eye: No discharge.         Left eye: No discharge.      Extraocular Movements: Extraocular movements intact.      Pupils: Pupils are equal, round, and reactive to light.   Neck:      Vascular: No carotid bruit.   Cardiovascular:      Rate and Rhythm: Normal rate and regular rhythm.      Pulses: Normal pulses. No decreased pulses.      Heart sounds: Normal heart sounds. Heart sounds not distant. No murmur heard.     No friction rub. No gallop.   Pulmonary:      Effort: Pulmonary effort is normal. No tachypnea, bradypnea, accessory muscle usage, prolonged expiration, respiratory distress or retractions. She is not intubated.      Breath sounds: Normal breath sounds. No stridor, decreased air movement or transmitted upper airway sounds. No decreased breath sounds, wheezing, rhonchi or rales.   Chest:      Chest wall: No tenderness.   Abdominal:      General: Abdomen is flat. Bowel sounds are normal.      Palpations: Abdomen is soft.   Musculoskeletal:         General: Normal range of motion.      Cervical back: Normal range of motion and neck supple. No rigidity or tenderness.   Lymphadenopathy:      Cervical: No cervical adenopathy.   Skin:     General: Skin is warm and dry.      Capillary Refill: Capillary refill takes less than 2 seconds.   Neurological:      General: No focal deficit present.      Mental Status: She is alert and oriented to person, place, and time.           ED Course & MDM   ED Course as of 02/12/25 1936 Wed Feb 12, 2025 1825 Chest x-ray reveals  IMPRESSION:  No acute cardiopulmonary process.   [EC]      ED Course User Index  [EC] PARMINDER Carlisle-CNP         Diagnoses as of 02/12/25 1936   EKG abnormality                 No data recorded     Elmer Coma Scale Score: 15 (02/12/25 1907 : Iliana Webb RN) HEART Score: 1 (02/12/25 1935 : [place]  JUAN Carmichael)                         Medical Decision Making  Given patient's complaint presentation a thorough exam was performed.  Patient remains hemodynamically stable during emergency evaluation, is afebrile, no adventitious lung sounds auscultated, speaking complete sentences no respiratory distress, cardiac sounds auscultated are regular, EKG interpreted by myself reveals normal sinus rhythm at a rate of 77 bpm with no ST elevation however there is T wave inversion in lead III, V1, V2, V3, V4, there is no previous EKG for comparison, VA interval of 168, QRS of 90 and QTc of 466.  Lab work was ordered including chest x-ray and EKG. Lab work reveals several irregularities however is grossly unremarkable, initial troponin revealed a value of less than 3, repeat troponin also revealed a value of less than 3, chest x-ray as interpreted by radiologist reveals no acute cardiopulmonary process.  Patient still denies any chest pain, palpitations, shortness of breath difficulty breathing, has a heart score of 1 and I do not believe any further cardiac stratification is warranted at this time.  I strongly encouraged following up with cardiology in the next several days however if symptoms become worse return to the emergency room for further evaluation.  Patient was agreeable with this plan and discharged home in stable condition.    JUAN Carlisle     Portions of this note were generated using digital voice recognition software, and may contain grammatical errors      Procedure  Procedures     JUAN Carlisle  02/12/25 1936

## 2025-02-12 NOTE — ED NOTES
Pt went to urgent care earlier due to be anxious and feeling like something was wrong. Pt received and EKG and was told to come due to abnormal EKG. Pt has known heart murmur. Denies chest pain or sob. Pt is anxious in triage.      Kassie Barber RN  02/12/25 [id number]

## 2025-02-12 NOTE — PROGRESS NOTES
Subjective   Patient ID: Sunitha Mckeon is a 29 y.o. female. They present today with a chief complaint of Other (Left side of jaw hurts by ear, having a panic attack X 1 day. ZEINAB-MA).    History of Present Illness  Patient presents with concern for left jaw irritation, ringing in the ear. She states she started getting worried last night that she was having a heart attack. States when she thinks about it she gets a tight sensation in her chest. She would like an ECG as she's worried.    Denies any risk factors like smoking, HTN, HLD, family history of early MI, heart disease. She does endorse a murmur and she is more worried because of that. States the murmur has never been evaluated by cardiology. Endorses no meds for treatment, denies any current cold/flu symptoms.           Past Medical History  Allergies as of 02/12/2025    (No Known Allergies)       (Not in a hospital admission)       History reviewed. No pertinent past medical history.    History reviewed. No pertinent surgical history.     reports that she has never smoked. She has never used smokeless tobacco. She reports current alcohol use. She reports that she does not use drugs.    Review of Systems  Review of Systems   Respiratory:  Negative for shortness of breath.    Cardiovascular:  Negative for chest pain.   Gastrointestinal:  Negative for nausea and vomiting.   Neurological:  Negative for dizziness, weakness and light-headedness.                                  Objective    Vitals:    02/12/25 1452   BP: 131/89   Pulse: 71   Resp: 14   Temp: 37.1 °C (98.8 °F)   SpO2: 98%     No LMP recorded.    Physical Exam  Vitals reviewed.   Constitutional:       Appearance: Normal appearance.   HENT:      Right Ear: Tympanic membrane and ear canal normal.      Left Ear: Tympanic membrane and ear canal normal.      Ears:      Comments: Serous otitis media noted bilaterally    Cardiovascular:      Rate and Rhythm: Normal rate.      Heart sounds: Murmur  heard.   Pulmonary:      Effort: Pulmonary effort is normal.      Breath sounds: Normal breath sounds.   Skin:     General: Skin is warm and dry.   Neurological:      Mental Status: She is alert.         Procedures    Point of Care Test & Imaging Results from this visit  No results found for this visit on 02/12/25.   No results found.    Diagnostic study results (if any) were reviewed by JUAN Oliver.    Assessment/Plan   Allergies, medications, history, and pertinent labs/EKGs/Imaging reviewed by JUAN Oliver.     Medical Decision Making  Advised she go to the ER for further evaluation of questionable ECG, and murmur. Advised the murmur is likely the cause of the abnormalities in the ECG, but it needs evaluation at higher level of care with further work up. Patient was agreeable, stated she will go now.     Orders and Diagnoses  Diagnoses and all orders for this visit:  Chest tightness  -     ECG 12 lead (Clinic Performed)      Medical Admin Record      Patient disposition: ED    Electronically signed by JUAN Oliver  3:31 PM

## 2025-02-17 LAB
ATRIAL RATE: 77 BPM
P AXIS: 68 DEGREES
P OFFSET: 199 MS
P ONSET: 138 MS
PR INTERVAL: 168 MS
Q ONSET: 222 MS
QRS COUNT: 13 BEATS
QRS DURATION: 90 MS
QT INTERVAL: 412 MS
QTC CALCULATION(BAZETT): 466 MS
QTC FREDERICIA: 447 MS
R AXIS: 96 DEGREES
T AXIS: 14 DEGREES
T OFFSET: 428 MS
VENTRICULAR RATE: 77 BPM

## 2025-02-26 PROBLEM — D22.5 MELANOCYTIC NEVI OF TRUNK: Status: RESOLVED | Noted: 2022-10-17 | Resolved: 2025-02-26

## 2025-02-26 PROBLEM — L82.1 OTHER SEBORRHEIC KERATOSIS: Status: RESOLVED | Noted: 2022-10-17 | Resolved: 2025-02-26

## 2025-02-26 PROBLEM — B00.1 COLD SORE: Status: RESOLVED | Noted: 2023-11-09 | Resolved: 2025-02-26

## 2025-02-26 PROBLEM — D18.01 HEMANGIOMA OF SKIN AND SUBCUTANEOUS TISSUE: Status: RESOLVED | Noted: 2022-10-17 | Resolved: 2025-02-26

## 2025-02-26 PROBLEM — L81.4 OTHER MELANIN HYPERPIGMENTATION: Status: RESOLVED | Noted: 2022-10-17 | Resolved: 2025-02-26

## 2025-02-26 PROBLEM — L70.0 ACNE VULGARIS: Status: RESOLVED | Noted: 2022-10-17 | Resolved: 2025-02-26

## 2025-03-19 ENCOUNTER — APPOINTMENT (OUTPATIENT)
Dept: CARDIOLOGY | Facility: CLINIC | Age: 30
End: 2025-03-19
Payer: COMMERCIAL

## 2025-03-19 VITALS
BODY MASS INDEX: 28.45 KG/M2 | DIASTOLIC BLOOD PRESSURE: 80 MMHG | HEART RATE: 89 BPM | WEIGHT: 177 LBS | HEIGHT: 66 IN | SYSTOLIC BLOOD PRESSURE: 118 MMHG | OXYGEN SATURATION: 98 %

## 2025-03-19 DIAGNOSIS — R07.89 CHEST TIGHTNESS: Primary | ICD-10-CM

## 2025-03-19 DIAGNOSIS — R01.1 HEART MURMUR: ICD-10-CM

## 2025-03-19 PROCEDURE — 99203 OFFICE O/P NEW LOW 30 MIN: CPT | Performed by: INTERNAL MEDICINE

## 2025-03-19 PROCEDURE — 1036F TOBACCO NON-USER: CPT | Performed by: INTERNAL MEDICINE

## 2025-03-19 PROCEDURE — 3008F BODY MASS INDEX DOCD: CPT | Performed by: INTERNAL MEDICINE

## 2025-03-19 NOTE — ASSESSMENT & PLAN NOTE
Sunitha has been experiencing episodes of chest tightness that have been occurring at rest.  She does not have any exertional angina symptoms.  A recent emergency department visit was unremarkable.  She does have a systolic murmur suggestive of either tricuspid or mitral valve regurgitation, and an echocardiogram will be obtained for further evaluation.  The chest pain description is very atypical and I would not recommend stress testing at this time.

## 2025-03-19 NOTE — PROGRESS NOTES
"      Reason For Consult:    Chest tightness    History Of Present Illness:    This is a 29-year-old female with no prior cardiac history.  She presents for a cardiology consultation, for evaluation of chest tightness.  She had an episode of chest tightness which occurred while she was in bed.  She became very anxious and was feeling somewhat short of breath.  Emergency department evaluation was unremarkable.  EKG showed sinus rhythm with an anterior T wave abnormality and no preexcitation.  Troponins were negative.    Past surgical history: Denies any major surgical procedures    Social history: Non-smoker    Family history: Negative for premature CAD    Review of systems:  Other review of systems negative other than as outlined in HPI    Social History:  She reports that she has never smoked. She has never used smokeless tobacco. She reports current alcohol use. She reports that she does not use drugs.    Family History:  No family history on file.    Allergies:  Patient has no known allergies.    Medications:  Current Outpatient Medications   Medication Instructions    clotrimazole-betamethasone (Lotrisone) cream     prenatal vit 91/iron/folic/dha (PRENATAL + DHA ORAL) Take by mouth.    tretinoin (Retin-A) 0.05 % cream Topical, Nightly, Apply a pea size amount nightly to entire face. Moisturize as needed       Vitals:      9/13/2023     2:38 PM 11/15/2023     9:16 AM 9/12/2024    12:53 PM 12/11/2024    12:18 PM 2/12/2025     2:52 PM 2/12/2025     6:45 PM 2/12/2025     7:06 PM   Vitals   Systolic 116 126 120 116 131 119    Diastolic 72 74 64 70 89 71    BP Location   Left arm       Heart Rate   66  71 68    Temp   36.1 °C (96.9 °F)  37.1 °C (98.8 °F)     Resp     14 16    Height  1.651 m (5' 5\") 1.651 m (5' 5\") 1.676 m (5' 6\")      Weight (lb) 188 185 179 175   175   BMI 30.34 kg/m2 30.79 kg/m2 29.79 kg/m2 28.25 kg/m2   28.25 kg/m2   BSA (m2) 1.99 m2 1.96 m2 1.93 m2 1.92 m2   1.92 m2   Visit Report  Report Report " "Report Report Report Report       Physical Exam:    General Appearance:  Alert, oriented, no distress  Skin:  Warm and dry  Head and Neck:  No elevation of JVP, no carotid bruits  Cardiac Exam:  Rhythm is regular, S1 and S2 are normal, grade 1/6 to 2/6 systolic murmur at the lower left sternal border and apex  Lungs:  Clear to auscultation  Extremities:  no edema  Neurologic:  No focal deficits  Psychiatric:  Appropriate mood and behavior    Lab Results:     CMP:  Recent Labs     02/12/25 1753 09/13/24  1326    136   K 3.6 4.0    102   CO2 25 25   ANIONGAP 13 13   BUN 11 11   CREATININE 0.81 0.83   EGFR >90 >90   MG 2.05  --      Recent Labs     02/12/25 1753 09/13/24  1326   ALBUMIN 5.1* 4.5   ALKPHOS 62 52   ALT 15 11   AST 12 11   BILITOT 0.8 0.8     CBC:  Recent Labs     02/12/25  1753 09/13/24  1326 07/31/23  2333 05/04/23  1200 01/10/23  1017 03/24/22  0844 01/05/22  0805 09/13/21  0835   WBC 9.9 7.8 9.9 10.5 8.7 11.1 10.1 8.1   HGB 14.3 12.7 12.3 12.1 13.0 13.3 11.5* 12.8   HCT 43.1 38.9 36.2 37.7 38.4 40.6 36.3 39.7    224 262 209 234 200 206 189   MCV 92 92 90 94 89 94 97 95     COAG: No results for input(s): \"PTT\", \"INR\", \"HAUF\", \"DDIMERVTE\", \"HAPTOGLOBIN\", \"FIBRINOGEN\" in the last 35956 hours.  HEME/ENDO:  Recent Labs     09/13/24  1326 05/04/23  1200   TSH 1.00 0.78   HGBA1C 5.0  --       CARDIAC:   Recent Labs     02/12/25  1841 02/12/25  1753   TROPHS <3 <3     Recent Labs     09/13/24  1326   CHOL 147   HDL 50.1   TRIG 93       Test Results (personally reviewed):    EKG: Normal sinus rhythm with anterior T wave abnormality    Assessment/Plan  Problem List Items Addressed This Visit             ICD-10-CM    Heart murmur R01.1    Chest tightness - Primary R07.89     Sunitha has been experiencing episodes of chest tightness that have been occurring at rest.  She does not have any exertional angina symptoms.  A recent emergency department visit was unremarkable.  She does have a " systolic murmur suggestive of either tricuspid or mitral valve regurgitation, and an echocardiogram will be obtained for further evaluation.  The chest pain description is very atypical and I would not recommend stress testing at this time.         Relevant Orders    Transthoracic echo (TTE) complete     James C Ramicone,

## 2025-03-19 NOTE — LETTER
March 19, 2025     Zabrina Cummings, APRN-CNP  [address] [facility] [place]  [facility] [id number]    Patient: Sunitha Mckeon   YOB: 1995   Date of Visit: 3/19/2025       Dear Dr. Zabrina Cummings, APRN-CNP:    Thank you for referring Sunitha Mckeon to me for evaluation. Below are my notes for this consultation.  If you have questions, please do not hesitate to call me. I look forward to following your patient along with you.       Sincerely,     James C Ramicone, DO      CC: No Recipients  ______________________________________________________________________________________          Reason For Consult:    Chest tightness    History Of Present Illness:    This is a 29-year-old female with no prior cardiac history.  She presents for a cardiology consultation, for evaluation of chest tightness.  She had an episode of chest tightness which occurred while she was in bed.  She became very anxious and was feeling somewhat short of breath.  Emergency department evaluation was unremarkable.  EKG showed sinus rhythm with an anterior T wave abnormality and no preexcitation.  Troponins were negative.    Past surgical history: Denies any major surgical procedures    Social history: Non-smoker    Family history: Negative for premature CAD    Review of systems:  Other review of systems negative other than as outlined in HPI    Social History:  She reports that she has never smoked. She has never used smokeless tobacco. She reports current alcohol use. She reports that she does not use drugs.    Family History:  No family history on file.    Allergies:  Patient has no known allergies.    Medications:  Current Outpatient Medications   Medication Instructions   • clotrimazole-betamethasone (Lotrisone) cream    • prenatal vit 91/iron/folic/dha (PRENATAL + DHA ORAL) Take by mouth.   • tretinoin (Retin-A) 0.05 % cream Topical, Nightly, Apply a pea size amount nightly to entire face. Moisturize as needed  "      Vitals:      9/13/2023     2:38 PM 11/15/2023     9:16 AM 9/12/2024    12:53 PM 12/11/2024    12:18 PM 2/12/2025     2:52 PM 2/12/2025     6:45 PM 2/12/2025     7:06 PM   Vitals   Systolic 116 126 120 116 131 119    Diastolic 72 74 64 70 89 71    BP Location   Left arm       Heart Rate   66  71 68    Temp   36.1 °C (96.9 °F)  37.1 °C (98.8 °F)     Resp     14 16    Height  1.651 m (5' 5\") 1.651 m (5' 5\") 1.676 m (5' 6\")      Weight (lb) 188 185 179 175   175   BMI 30.34 kg/m2 30.79 kg/m2 29.79 kg/m2 28.25 kg/m2   28.25 kg/m2   BSA (m2) 1.99 m2 1.96 m2 1.93 m2 1.92 m2   1.92 m2   Visit Report  Report Report Report Report Report Report       Physical Exam:    General Appearance:  Alert, oriented, no distress  Skin:  Warm and dry  Head and Neck:  No elevation of JVP, no carotid bruits  Cardiac Exam:  Rhythm is regular, S1 and S2 are normal, grade 1/6 to 2/6 systolic murmur at the lower left sternal border and apex  Lungs:  Clear to auscultation  Extremities:  no edema  Neurologic:  No focal deficits  Psychiatric:  Appropriate mood and behavior    Lab Results:     CMP:  Recent Labs     02/12/25 1753 09/13/24  1326    136   K 3.6 4.0    102   CO2 25 25   ANIONGAP 13 13   BUN 11 11   CREATININE 0.81 0.83   EGFR >90 >90   MG 2.05  --      Recent Labs     02/12/25 1753 09/13/24  1326   ALBUMIN 5.1* 4.5   ALKPHOS 62 52   ALT 15 11   AST 12 11   BILITOT 0.8 0.8     CBC:  Recent Labs     02/12/25  1753 09/13/24  1326 07/31/23  2333 05/04/23  1200 01/10/23  1017 03/24/22  0844 01/05/22  0805 09/13/21  0835   WBC 9.9 7.8 9.9 10.5 8.7 11.1 10.1 8.1   HGB 14.3 12.7 12.3 12.1 13.0 13.3 11.5* 12.8   HCT 43.1 38.9 36.2 37.7 38.4 40.6 36.3 39.7    224 262 209 234 200 206 189   MCV 92 92 90 94 89 94 97 95     COAG: No results for input(s): \"PTT\", \"INR\", \"HAUF\", \"DDIMERVTE\", \"HAPTOGLOBIN\", \"FIBRINOGEN\" in the last 72265 hours.  HEME/ENDO:  Recent Labs     09/13/24  1326 05/04/23  1200   TSH 1.00 0.78   HGBA1C " 5.0  --       CARDIAC:   Recent Labs     02/12/25  1841 02/12/25  1753   TROPHS <3 <3     Recent Labs     09/13/24  1326   CHOL 147   HDL 50.1   TRIG 93       Test Results (personally reviewed):    EKG: Normal sinus rhythm with anterior T wave abnormality    Assessment/Plan  Problem List Items Addressed This Visit             ICD-10-CM    Heart murmur R01.1    Chest tightness - Primary R07.89     Sunitha has been experiencing episodes of chest tightness that have been occurring at rest.  She does not have any exertional angina symptoms.  A recent emergency department visit was unremarkable.  She does have a systolic murmur suggestive of either tricuspid or mitral valve regurgitation, and an echocardiogram will be obtained for further evaluation.  The chest pain description is very atypical and I would not recommend stress testing at this time.         Relevant Orders    Transthoracic echo (TTE) complete     James C Ramicone, DO

## 2025-04-24 ENCOUNTER — HOSPITAL ENCOUNTER (OUTPATIENT)
Dept: CARDIOLOGY | Facility: CLINIC | Age: 30
Discharge: HOME | End: 2025-04-24
Payer: COMMERCIAL

## 2025-04-24 DIAGNOSIS — R07.89 CHEST TIGHTNESS: ICD-10-CM

## 2025-04-24 LAB
AORTIC VALVE PEAK VELOCITY: 1.26 M/S
AV PEAK GRADIENT: 6 MMHG
AVA (PEAK VEL): 2.36 CM2
EJECTION FRACTION APICAL 4 CHAMBER: 60.1
EJECTION FRACTION: 62 %
LEFT ATRIUM VOLUME AREA LENGTH INDEX BSA: 37.8 ML/M2
LEFT VENTRICLE INTERNAL DIMENSION DIASTOLE: 4.47 CM (ref 3.5–6)
LEFT VENTRICULAR OUTFLOW TRACT DIAMETER: 1.94 CM
MITRAL VALVE E/A RATIO: 1.17
RIGHT VENTRICLE FREE WALL PEAK S': 15 CM/S
RIGHT VENTRICLE PEAK SYSTOLIC PRESSURE: 29 MMHG
TRICUSPID ANNULAR PLANE SYSTOLIC EXCURSION: 2.7 CM

## 2025-04-24 PROCEDURE — 93306 TTE W/DOPPLER COMPLETE: CPT

## 2025-04-24 PROCEDURE — 93306 TTE W/DOPPLER COMPLETE: CPT | Performed by: INTERNAL MEDICINE

## 2025-04-29 ENCOUNTER — TELEPHONE (OUTPATIENT)
Dept: CARDIOLOGY | Facility: CLINIC | Age: 30
End: 2025-04-29
Payer: COMMERCIAL

## 2025-04-29 NOTE — TELEPHONE ENCOUNTER
Answering service message: Hasn't heard back about results of a test last week. Asked for a c/b Tues.

## 2025-04-29 NOTE — TELEPHONE ENCOUNTER
Patient calling for test results.  I did call patient and left message for her to let us know if she is still having symptoms.  If not we would check on the plan of care with Dr. Ramicone.     CONCLUSIONS:   1. The left ventricular systolic function is normal, with a Rausch's biplane calculated ejection fraction of 62%.   2. Moderately enlarged right ventricle.   3. The left atrium is mildly dilated.   4. Right ventricular systolic pressure is within normal limits.   5. A bubble study using agitated saline was performed. Bubble study is positive. A large PFO (> 20 bubbles) was demonstrated.

## 2025-05-02 DIAGNOSIS — Q21.12 PFO (PATENT FORAMEN OVALE) (HHS-HCC): ICD-10-CM

## 2025-05-02 DIAGNOSIS — R07.89 CHEST TIGHTNESS: ICD-10-CM

## 2025-05-02 NOTE — TELEPHONE ENCOUNTER
Discussed with Dr Ramicone, he recommends a referral to [facility]. Referral placed. Pt notified.

## 2025-06-18 ENCOUNTER — OFFICE VISIT (OUTPATIENT)
Dept: CARDIOLOGY | Facility: CLINIC | Age: 30
End: 2025-06-18
Payer: COMMERCIAL

## 2025-06-18 VITALS
BODY MASS INDEX: 28.61 KG/M2 | HEART RATE: 76 BPM | SYSTOLIC BLOOD PRESSURE: 118 MMHG | DIASTOLIC BLOOD PRESSURE: 81 MMHG | WEIGHT: 178 LBS | OXYGEN SATURATION: 98 % | HEIGHT: 66 IN

## 2025-06-18 DIAGNOSIS — Q21.12 PFO (PATENT FORAMEN OVALE) (HHS-HCC): ICD-10-CM

## 2025-06-18 DIAGNOSIS — I51.7 RIGHT HEART ENLARGEMENT: Primary | ICD-10-CM

## 2025-06-18 PROCEDURE — 99212 OFFICE O/P EST SF 10 MIN: CPT

## 2025-06-18 PROCEDURE — 99205 OFFICE O/P NEW HI 60 MIN: CPT | Performed by: INTERNAL MEDICINE

## 2025-06-18 PROCEDURE — 3008F BODY MASS INDEX DOCD: CPT | Performed by: INTERNAL MEDICINE

## 2025-06-18 RX ORDER — ASPIRIN 81 MG/1
81 TABLET ORAL DAILY
COMMUNITY

## 2025-06-18 ASSESSMENT — LIFESTYLE VARIABLES
AUDIT-C TOTAL SCORE: 3
HOW MANY STANDARD DRINKS CONTAINING ALCOHOL DO YOU HAVE ON A TYPICAL DAY: 1 OR 2
HOW OFTEN DO YOU HAVE A DRINK CONTAINING ALCOHOL: 2-3 TIMES A WEEK
HOW OFTEN DO YOU HAVE SIX OR MORE DRINKS ON ONE OCCASION: NEVER
SKIP TO QUESTIONS 9-10: 1

## 2025-06-18 ASSESSMENT — PAIN SCALES - GENERAL: PAINLEVEL_OUTOF10: 0-NO PAIN

## 2025-06-18 ASSESSMENT — COLUMBIA-SUICIDE SEVERITY RATING SCALE - C-SSRS
2. HAVE YOU ACTUALLY HAD ANY THOUGHTS OF KILLING YOURSELF?: NO
1. IN THE PAST MONTH, HAVE YOU WISHED YOU WERE DEAD OR WISHED YOU COULD GO TO SLEEP AND NOT WAKE UP?: NO
6. HAVE YOU EVER DONE ANYTHING, STARTED TO DO ANYTHING, OR PREPARED TO DO ANYTHING TO END YOUR LIFE?: NO

## 2025-06-18 ASSESSMENT — ENCOUNTER SYMPTOMS
LOSS OF SENSATION IN FEET: 0
DEPRESSION: 0
OCCASIONAL FEELINGS OF UNSTEADINESS: 0

## 2025-06-18 ASSESSMENT — PATIENT HEALTH QUESTIONNAIRE - PHQ9
1. LITTLE INTEREST OR PLEASURE IN DOING THINGS: NOT AT ALL
2. FEELING DOWN, DEPRESSED OR HOPELESS: NOT AT ALL
SUM OF ALL RESPONSES TO PHQ9 QUESTIONS 1 AND 2: 0

## 2025-06-18 NOTE — PROGRESS NOTES
PCP:  PARMINDER Poon-CNP  Cards: Ramicone    This is a 29 y.o health female with no significant PMH.    After going to the ED for an episode of chest tightness and SOB, EKG revealed anterior T waves abnormality. TTE was then performed and showed a large intracardiac right to left shunt on bubble study with moderate enlargement of her RV. LVEF was normal.     She was thus referred to our clinical for ASD/PFO closure evaluation.    She reports occasional episodes of shortness of breath not associated with exercise. She has noticed some fatiguability with resuming exercise after childbirth that she has attributed to deconditioning. She has not had any leg edema or unusual weight gain. No orthopnea or PND. No palpitations, presyncope,or syncope. She has had 2 uneventful pregnancies and is considering a third.    She presents today accompanied by her .    ROS:     Constitutional: not feeling tired, not feeling poorly, no fever and no chills.   Eyes: no eyesight problems, no blurred vision, no diplopia, no eye pain, no purulent discharge from the eyes, eyes not red, no dryness of the eyes and no itching of the eyes.   ENT: no nosebleeds, no hearing loss, no tinnitus, no earache, no sore throat, no hoarseness, no swollen glands in the neck and no nasal discharge.   Cardiovascular: no intermittent leg claudication, no chest pain, no tightness or heavy pressure, as above shortness of breath, no palpitations, no lower extremity edema, the heart rate was not slow, the heart rate was not fast and as noted in HPI.   Respiratory: no chronic cough, not coughing up sputum,  no wheezing that is consistent with asthma, no asthma, no orthopnea and no postural nocturnal dyspnea.   Gastrointestinal: no change in bowel habits, no blood in stools, no diarrhea, no constipation, no nausea, no vomiting, no abdominal pain, no signs and symptoms of ulcer disease, no eric colored stools and no intolerance to fatty foods.  "  Genitourinary: no hematuria,  no urinary frequency, no dysuria, no incontinence, no burning sensation during urination, no urinary hesitancy, no nocturia, no genital lesion, no testicular pain, urinary stream is not smaller and urinary stream does not start and stop.   Musculoskeletal: no arthralgias, no myalgias, no joint swelling, no joint stiffness, no muscle weakness, no back pain, no limb pain, no limb swelling and no difficulty walking.   Skin: no skin rashes, no change in skin color and pigmentation, no skin lesions and no skin lumps.   Neurological: no seizures, no frequent falls, no headaches, no dizziness, no tingling, no fainting and no limb weakness.   Psychiatric: no depression, not suicidal, no confusion, no memory lapses or loss, no anxiety, no personality change and no emotional problems.   Endocrine: no goiter, no thyroid disorder, no diabetes mellitus, no excessive thirst, no dry skin, no cold intolerance, no heat intolerance, no erectile dysfunction, no increased urinary frequency, no proptosis and no deepening of the voice.   Hematologic/Lymphatic: no bleeding issues.   All other systems have been reviewed and are negative for complaint.     Physical Exam:    Visit Vitals  /81 (BP Location: Left arm, Patient Position: Sitting, BP Cuff Size: Adult long)   Pulse 76   Ht 1.676 m (5' 6\")   Wt 80.7 kg (178 lb)   LMP 12/10/2024 (Exact Date)   SpO2 98%   BMI 28.73 kg/m²   OB Status Pregnant   Smoking Status Never   BSA 1.94 m²        Constitutional: alert and in no acute distress.   Eyes: no erythema, swelling or discharge from the eye .   Ears, Nose, Mouth, and Throat: external inspection of ears and nose is normal , lips, teeth, and gums are normal with good dentition  and oropharynx normal with no erythema, edema, exudate or lesions .   Neck: neck is supple, symmetric, trachea midline, no masses  and no thyromegaly .   Pulmonary: no increased work of breathing or signs of respiratory distress " , lungs clear to auscultation. , normal percussion of chest  and chest palpation normal .   Cardiovascular: RRR, 3/6 systolic murmur,  no leg edema, non-displaced PMI, no S3 or S4  Abdomen: abdomen non-tender, no masses  and no hepatomegaly .           Skin:  no skin lesions          Neurologic: non-focal neurologic examination.      Psychiatric judgment and insight is normal , oriented to person, place and time , normal mood and affect .       Labs:  Results for orders placed or performed during the hospital encounter of 04/24/25   Transthoracic echo (TTE) complete    Collection Time: 04/24/25  2:51 PM   Result Value Ref Range    AV pk shailesh 1.26 m/s    LVOT diam 1.94 cm    MV E/A ratio 1.17     LA vol index A/L 37.8 ml/m2    Tricuspid annular plane systolic excursion 2.7 cm    LV EF 62 %    RV free wall pk S' 15.00 cm/s    LVIDd 4.47 cm    RVSP 29.0 mmHg    Aortic Valve Area by Continuity of Peak Velocity 2.36 cm2    AV pk grad 6 mmHg    LV A4C EF 60.1           Meds:    Current Outpatient Medications   Medication Instructions    aspirin 81 mg, Daily    prenatal vit 91/iron/folic/dha (PRENATAL + DHA ORAL) Take by mouth.    tretinoin (Retin-A) 0.05 % cream Topical, Nightly, Apply a pea size amount nightly to entire face. Moisturize as needed          Assessment/Plan:      This is a 29 y.o healthy female with no significant PMH with right ventricular enlargement. She has no evidence of heart failure. PFO does not explain her RV enlargement. There is concern for significant left to right shunt.     I had a long conversation with the patient and her  in which we discussed the pathophysiology of right heart enlargement as it related to intracardiac shunting. Although the cause of her shunt is not yet clear, we also briefly touched upon surgical and percutaneous strategies that can address such shunts.    The patient requested if she can travel and resume normal activities. Reassurance was given to her. She may  travel.     We will proceed with a EBONI evaluate cause of left to right shunting and have patient return to clinic for further discussion.    Edvin Gonzales MD  , Interventional Cardiology Fellowship Program   [facility] [place]   [facility] [employer]  Office Phone Number: [phone]

## 2025-06-24 ENCOUNTER — HOSPITAL ENCOUNTER (OUTPATIENT)
Dept: CARDIOLOGY | Facility: HOSPITAL | Age: 30
Discharge: HOME | End: 2025-06-24
Payer: COMMERCIAL

## 2025-06-24 VITALS
HEART RATE: 67 BPM | OXYGEN SATURATION: 96 % | DIASTOLIC BLOOD PRESSURE: 73 MMHG | RESPIRATION RATE: 18 BRPM | SYSTOLIC BLOOD PRESSURE: 103 MMHG

## 2025-06-24 DIAGNOSIS — Q21.12 PFO (PATENT FORAMEN OVALE) (HHS-HCC): ICD-10-CM

## 2025-06-24 LAB — EJECTION FRACTION: 63 %

## 2025-06-24 PROCEDURE — 93325 DOPPLER ECHO COLOR FLOW MAPG: CPT | Performed by: INTERNAL MEDICINE

## 2025-06-24 PROCEDURE — 99153 MOD SED SAME PHYS/QHP EA: CPT

## 2025-06-24 PROCEDURE — 99152 MOD SED SAME PHYS/QHP 5/>YRS: CPT | Performed by: INTERNAL MEDICINE

## 2025-06-24 PROCEDURE — 93320 DOPPLER ECHO COMPLETE: CPT | Performed by: INTERNAL MEDICINE

## 2025-06-24 PROCEDURE — 93320 DOPPLER ECHO COMPLETE: CPT

## 2025-06-24 PROCEDURE — 2500000005 HC RX 250 GENERAL PHARMACY W/O HCPCS: Performed by: INTERNAL MEDICINE

## 2025-06-24 PROCEDURE — 93312 ECHO TRANSESOPHAGEAL: CPT | Performed by: INTERNAL MEDICINE

## 2025-06-24 PROCEDURE — 2500000004 HC RX 250 GENERAL PHARMACY W/ HCPCS (ALT 636 FOR OP/ED): Performed by: INTERNAL MEDICINE

## 2025-06-24 PROCEDURE — 99152 MOD SED SAME PHYS/QHP 5/>YRS: CPT

## 2025-06-24 RX ORDER — FENTANYL CITRATE 50 UG/ML
INJECTION, SOLUTION INTRAMUSCULAR; INTRAVENOUS AS NEEDED
Status: DISCONTINUED | OUTPATIENT
Start: 2025-06-24 | End: 2025-06-25 | Stop reason: HOSPADM

## 2025-06-24 RX ORDER — LIDOCAINE HYDROCHLORIDE 20 MG/ML
SOLUTION OROPHARYNGEAL AS NEEDED
Status: DISCONTINUED | OUTPATIENT
Start: 2025-06-24 | End: 2025-06-25 | Stop reason: HOSPADM

## 2025-06-24 RX ORDER — MIDAZOLAM HYDROCHLORIDE 1 MG/ML
INJECTION INTRAMUSCULAR; INTRAVENOUS AS NEEDED
Status: DISCONTINUED | OUTPATIENT
Start: 2025-06-24 | End: 2025-06-25 | Stop reason: HOSPADM

## 2025-06-24 RX ADMIN — MIDAZOLAM HYDROCHLORIDE 1 MG: 1 INJECTION, SOLUTION INTRAMUSCULAR; INTRAVENOUS at 09:17

## 2025-06-24 RX ADMIN — MIDAZOLAM HYDROCHLORIDE 1 MG: 1 INJECTION, SOLUTION INTRAMUSCULAR; INTRAVENOUS at 09:32

## 2025-06-24 RX ADMIN — FENTANYL CITRATE 25 MCG: 50 INJECTION, SOLUTION INTRAMUSCULAR; INTRAVENOUS at 09:21

## 2025-06-24 RX ADMIN — LIDOCAINE HYDROCHLORIDE 15 ML: 20 SOLUTION ORAL at 09:10

## 2025-06-24 RX ADMIN — MIDAZOLAM HYDROCHLORIDE 0.5 MG: 1 INJECTION, SOLUTION INTRAMUSCULAR; INTRAVENOUS at 09:25

## 2025-06-24 RX ADMIN — FENTANYL CITRATE 12.5 MCG: 50 INJECTION, SOLUTION INTRAMUSCULAR; INTRAVENOUS at 09:25

## 2025-06-24 RX ADMIN — BENZOCAINE 4 SPRAY: 200 SPRAY DENTAL; ORAL; PERIODONTAL at 09:10

## 2025-06-24 RX ADMIN — FENTANYL CITRATE 12.5 MCG: 50 INJECTION, SOLUTION INTRAMUSCULAR; INTRAVENOUS at 09:29

## 2025-06-24 RX ADMIN — MIDAZOLAM HYDROCHLORIDE 1 MG: 1 INJECTION, SOLUTION INTRAMUSCULAR; INTRAVENOUS at 09:21

## 2025-06-24 RX ADMIN — MIDAZOLAM HYDROCHLORIDE 0.5 MG: 1 INJECTION, SOLUTION INTRAMUSCULAR; INTRAVENOUS at 09:29

## 2025-06-24 RX ADMIN — FENTANYL CITRATE 25 MCG: 50 INJECTION, SOLUTION INTRAMUSCULAR; INTRAVENOUS at 09:17

## 2025-06-25 ENCOUNTER — TELEMEDICINE (OUTPATIENT)
Dept: CARDIOLOGY | Facility: CLINIC | Age: 30
End: 2025-06-25
Payer: COMMERCIAL

## 2025-06-25 DIAGNOSIS — Q21.12 PFO (PATENT FORAMEN OVALE) (HHS-HCC): Primary | ICD-10-CM

## 2025-06-25 DIAGNOSIS — I51.7 ENLARGED RV (RIGHT VENTRICLE): ICD-10-CM

## 2025-06-25 PROCEDURE — 99213 OFFICE O/P EST LOW 20 MIN: CPT | Performed by: INTERNAL MEDICINE

## 2025-06-25 NOTE — PROGRESS NOTES
Virtual visit : Total time spent 60 minutes including chart preparation and face to face encounter.     PCP:  PARMINDER Poon-CNP  Cards: Dr. Ramicone     This is a 29 y.o health female with no significant PMH. After going to the ED for an episode of chest tightness and SOB, EKG revealed anterior T waves abnormality. TTE was then performed and showed a large intracardiac right to left shunt on bubble study with moderate enlargement of her RV. LVEF was normal.      She was thus referred to our clinical for ASD/PFO closure evaluation.     She reports occasional episodes of shortness of breath not associated with exercise. She has noticed some fatiguability with resuming exercise after childbirth that she has attributed to deconditioning. She has not had any leg edema or unusual weight gain. No orthopnea or PND. No palpitations, presyncope,or syncope. She has had 2 uneventful pregnancies and is considering a third.    ROS:     Constitutional: not feeling tired, not feeling poorly, no fever and no chills.   Eyes: no eyesight problems, no blurred vision, no diplopia, no eye pain, no purulent discharge from the eyes, eyes not red, no dryness of the eyes and no itching of the eyes.   ENT: no nosebleeds, no hearing loss, no tinnitus, no earache, no sore throat, no hoarseness, no swollen glands in the neck and no nasal discharge.   Cardiovascular: no intermittent leg claudication, no chest pain, no tightness or heavy pressure, no shortness of breath, no palpitations, no lower extremity edema, the heart rate was not slow, the heart rate was not fast and as noted in HPI.   Respiratory: no chronic cough, not coughing up sputum,  no wheezing that is consistent with asthma, no asthma, no orthopnea and no postural nocturnal dyspnea.   Gastrointestinal: no change in bowel habits, no blood in stools, no diarrhea, no constipation, no nausea, no vomiting, no abdominal pain, no signs and symptoms of ulcer disease, no eric  colored stools and no intolerance to fatty foods.   Genitourinary: no hematuria,  no urinary frequency, no dysuria, no incontinence, no burning sensation during urination, no urinary hesitancy, no nocturia, no genital lesion, no testicular pain, urinary stream is not smaller and urinary stream does not start and stop.   Musculoskeletal: no arthralgias, no myalgias, no joint swelling, no joint stiffness, no muscle weakness, no back pain, no limb pain, no limb swelling and no difficulty walking.   Skin: no skin rashes, no change in skin color and pigmentation, no skin lesions and no skin lumps.   Neurological: no seizures, no frequent falls, no headaches, no dizziness, no tingling, no fainting and no limb weakness.   Psychiatric: no depression, not suicidal, no confusion, no memory lapses or loss, no anxiety, no personality change and no emotional problems.   Endocrine: no goiter, no thyroid disorder, no diabetes mellitus, no excessive thirst, no dry skin, no cold intolerance, no heat intolerance, no erectile dysfunction, no increased urinary frequency, no proptosis and no deepening of the voice.   Hematologic/Lymphatic: no bleeding issues.   All other systems have been reviewed and are negative for complaint.     Physical Exam: virtual encounter      Labs:  Results for orders placed or performed during the hospital encounter of 06/24/25   Transesophageal echo (EBONI)    Collection Time: 06/24/25 10:21 AM   Result Value Ref Range    LV EF 63 %      I personally reviewed the EBONI and as reported: There is mildly reduced right ventricular systolic function. Severely enlarged right ventricle. There is tunnel type PFO with left to right shunting by color Doppler. The agitated saline contrast study did show some right to left shunting through the PFO, however this PFO is relatively small and does not explain the markedly dilated right side or the total opacification of the left side of the heart early after injection on the  agitated saline contrast study done on the TTE from 4/24/2025 ( the bubbles appeared to fill the LV from the pulmonary veins rather than through the atrial septum. The EBONI ruled out a primum or sendum ASD and sinus venosus ASD. The flow in from the pulmonary veins appears to be increased ( increased velocity and some turbulence) from unclear cause. Recommend cardiac MRI to further assess. ( unclear if there could be partial anomalous pulmonary veins). The right atrium is severely dilated. A bubble study using agitated saline was performed. Bubble study is positive.    Meds:    Current Outpatient Medications   Medication Instructions    prenatal vit 91/iron/folic/dha (PRENATAL + DHA ORAL) Take by mouth.      Reviewed images from EBONI study    Assessment/Plan:    This is a 29 y.o health female with no significant PMH. She was thus referred to our clinical for ASD/PFO closure evaluation. There is mildly reduced right ventricular systolic function. Severely enlarged right ventricle. The EBONI ruled out primum or secundum ASD and sinus venosus ASD.     We called the MRI attending (Dr. Alvarez) to discuss her case and images, he was in favor to order a MRI with 4D flow analysis that would be able to rule out further causes of RV enlargement.    Patient will return to follow-up after MRI is complete.    I saw and evaluated the patient. I personally obtained the key and critical portions of the history and physical exam or was physically present for key and critical portions performed by the resident/fellow. I reviewed the resident/fellow's documentation and discussed the patient with the resident/fellow. I agree with the resident/fellow's medical decision making as documented in the note.    Edvin Gonzales MD  , Interventional Cardiology Fellowship Program   [facility] [place]   [facility] [employer] [facility]  Office Phone  Number: [phone]

## 2025-06-26 ENCOUNTER — APPOINTMENT (OUTPATIENT)
Dept: CARDIOLOGY | Facility: CLINIC | Age: 30
End: 2025-06-26
Payer: COMMERCIAL

## 2025-07-03 ENCOUNTER — OFFICE VISIT (OUTPATIENT)
Dept: PRIMARY CARE | Facility: CLINIC | Age: 30
End: 2025-07-03
Payer: COMMERCIAL

## 2025-07-03 VITALS
OXYGEN SATURATION: 99 % | SYSTOLIC BLOOD PRESSURE: 118 MMHG | BODY MASS INDEX: 29.18 KG/M2 | HEART RATE: 82 BPM | TEMPERATURE: 97.1 F | WEIGHT: 180.8 LBS | DIASTOLIC BLOOD PRESSURE: 80 MMHG

## 2025-07-03 DIAGNOSIS — F41.9 ANXIETY: Primary | ICD-10-CM

## 2025-07-03 PROCEDURE — 1036F TOBACCO NON-USER: CPT | Performed by: NURSE PRACTITIONER

## 2025-07-03 PROCEDURE — 99214 OFFICE O/P EST MOD 30 MIN: CPT | Performed by: NURSE PRACTITIONER

## 2025-07-03 RX ORDER — ESCITALOPRAM OXALATE 10 MG/1
10 TABLET ORAL DAILY
Qty: 14 TABLET | Refills: 0 | Status: SHIPPED | OUTPATIENT
Start: 2025-07-03 | End: 2025-07-17

## 2025-07-03 ASSESSMENT — PATIENT HEALTH QUESTIONNAIRE - PHQ9
4. FEELING TIRED OR HAVING LITTLE ENERGY: SEVERAL DAYS
9. THOUGHTS THAT YOU WOULD BE BETTER OFF DEAD, OR OF HURTING YOURSELF: NOT AT ALL
SUM OF ALL RESPONSES TO PHQ9 QUESTIONS 1 AND 2: 0
5. POOR APPETITE OR OVEREATING: MORE THAN HALF THE DAYS
6. FEELING BAD ABOUT YOURSELF - OR THAT YOU ARE A FAILURE OR HAVE LET YOURSELF OR YOUR FAMILY DOWN: NOT AT ALL
7. TROUBLE CONCENTRATING ON THINGS, SUCH AS READING THE NEWSPAPER OR WATCHING TELEVISION: SEVERAL DAYS
1. LITTLE INTEREST OR PLEASURE IN DOING THINGS: NOT AT ALL
1. LITTLE INTEREST OR PLEASURE IN DOING THINGS: NOT AT ALL
SUM OF ALL RESPONSES TO PHQ QUESTIONS 1-9: 6
2. FEELING DOWN, DEPRESSED OR HOPELESS: NOT AT ALL
3. TROUBLE FALLING OR STAYING ASLEEP OR SLEEPING TOO MUCH: MORE THAN HALF THE DAYS
8. MOVING OR SPEAKING SO SLOWLY THAT OTHER PEOPLE COULD HAVE NOTICED. OR THE OPPOSITE, BEING SO FIGETY OR RESTLESS THAT YOU HAVE BEEN MOVING AROUND A LOT MORE THAN USUAL: NOT AT ALL

## 2025-07-03 ASSESSMENT — PAIN SCALES - GENERAL: PAINLEVEL_OUTOF10: 0-NO PAIN

## 2025-07-03 ASSESSMENT — ANXIETY QUESTIONNAIRES
7. FEELING AFRAID AS IF SOMETHING AWFUL MIGHT HAPPEN: MORE THAN HALF THE DAYS
GAD7 TOTAL SCORE: 15
3. WORRYING TOO MUCH ABOUT DIFFERENT THINGS: MORE THAN HALF THE DAYS
IF YOU CHECKED OFF ANY PROBLEMS ON THIS QUESTIONNAIRE, HOW DIFFICULT HAVE THESE PROBLEMS MADE IT FOR YOU TO DO YOUR WORK, TAKE CARE OF THINGS AT HOME, OR GET ALONG WITH OTHER PEOPLE: SOMEWHAT DIFFICULT
6. BECOMING EASILY ANNOYED OR IRRITABLE: NEARLY EVERY DAY
1. FEELING NERVOUS, ANXIOUS, OR ON EDGE: NEARLY EVERY DAY
5. BEING SO RESTLESS THAT IT IS HARD TO SIT STILL: SEVERAL DAYS
4. TROUBLE RELAXING: MORE THAN HALF THE DAYS
2. NOT BEING ABLE TO STOP OR CONTROL WORRYING: MORE THAN HALF THE DAYS

## 2025-07-03 ASSESSMENT — ENCOUNTER SYMPTOMS: DEPRESSION: 0

## 2025-07-03 NOTE — PROGRESS NOTES
Subjective   Patient ID: Sunitha Mckeon is a 29 y.o. female who presents for Anxiety.    HPI   Pleasant newly established 27 y/o female with PMH Heart Murmur  presents with c/o anxiety    Anxiety-in the past able to manage with CBT such as deep breathing, distraction. 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 Denies SI, HI. Is interested in starting a daily medication. Unsure if she needs a Counselor, recommend she establish with someone to help deal with possible upcoming procedures  PHQ-P Score 6  EVARISTO 7 Score 15    She is currently following with Interventional Cardiology Dr Gonzales for ASD/PFO closure which is increasing her anxiety. Her Cardiologist is Dr Ramicone. Cardiac MRI scheduled  Per Cardiology note  After going to the ED for an episode of chest tightness and SOB, EKG revealed anterior T waves abnormality. TTE was then performed and showed a large intracardiac right to left shunt on bubble study with moderate enlargement of her RV. LVEF was normal.    4/24/2025 ( the bubbles appeared to fill the LV from the pulmonary veins rather than through the atrial septum. The EBONI ruled out a primum or sendum ASD and sinus venosus ASD. The flow in from the pulmonary veins appears to be increased ( increased velocity and some turbulence) from unclear cause. Recommend cardiac MRI to further assess. ( unclear if there could be partial anomalous pulmonary veins). The right atrium is severely dilated. A bubble study using agitated saline was performed. Bubble study is positive.       Review of Systems  Review of Systems   Constitutional: Negative.    HENT: Negative.     Respiratory: Negative.     Cardiovascular: Negative.    Gastrointestinal: Negative.    Genitourinary: Negative.    Musculoskeletal: Negative.    Psychiatric/Behavioral: HPI  All other systems reviewed and are  negative.    Objective   /80 (BP Location: Left arm, Patient Position: Sitting)   Pulse 82   Temp 36.2 °C (97.1 °F) (Temporal)   Wt 82 kg (180 lb 12.8 oz)   LMP 07/03/2025   SpO2 99%   Breastfeeding No   BMI 29.18 kg/m²     Physical Exam  Vitals reviewed.   Constitutional:       Appearance: Normal appearance.   HENT:      Head: Normocephalic and atraumatic.   Cardiovascular:      Rate and Rhythm: Normal rate and regular rhythm.   Pulmonary:      Effort: Pulmonary effort is normal.      Breath sounds: Normal breath sounds.   Musculoskeletal:         General: Normal range of motion.      Cervical back: Normal range of motion and neck supple.   Neurological:      General: No focal deficit present.      Mental Status: She is alert.   Psychiatric:         Mood and Affect: Mood normal.         Behavior: Behavior normal.         Thought Content: Thought content normal.         Assessment/Plan   Problem List Items Addressed This Visit           ICD-10-CM    Anxiety - Primary F41.9    Discussed precautions  Return in 2 weeks for follow up  Handouts provided and reviewed with patient, discussed           Relevant Medications    escitalopram (Lexapro) 10 mg tablet    Other Relevant Orders    Referral to Psychology

## 2025-07-03 NOTE — ASSESSMENT & PLAN NOTE
Discussed precautions  Return in 2 weeks for follow up  Handouts provided and reviewed with patient, discussed

## 2025-07-03 NOTE — PATIENT INSTRUCTIONS
A prescription was sent to your pharmacy. Your pharmacist can answer any questions or concerns you may have or you may call the office.    Experiencing occasional anxiety is a normal part of life    Anxiety is not always related to an underlying condition. It may be caused by:   * Stress from work, school, or personal relationships  * Emotional trauma   * Financial concerns   * Stress caused by a chronic or serious medical condition   * A major event or performance such as a wedding, move, birth  * Side effect of certain medications   * Alcohol consumption, drugs such as cocaine   * Lack of oxygen     Treatment for Anxiety can include  * Exercise daily   * Maintain a positive attitude   * Get enough sleep   * Learn what triggers your anxiety and alleviating it   * Eat a well balanced diet  * Practicing relaxation techniques such as yoga   * Stopping smoking   * Limiting consumption of caffeine and alcohol    See a doctor if you notice your anxiety is interfering with daily tasks. They can refer you to a Psychiatrist to evaluate if a medication would be helpful for you  This is especially important if your anxiety is accompanied by Insomnia, Depression, or Suicidal thoughts

## 2025-07-17 ENCOUNTER — APPOINTMENT (OUTPATIENT)
Dept: PRIMARY CARE | Facility: CLINIC | Age: 30
End: 2025-07-17
Payer: COMMERCIAL

## 2025-07-17 VITALS
WEIGHT: 178 LBS | BODY MASS INDEX: 28.73 KG/M2 | HEART RATE: 72 BPM | TEMPERATURE: 98.1 F | SYSTOLIC BLOOD PRESSURE: 116 MMHG | DIASTOLIC BLOOD PRESSURE: 75 MMHG | OXYGEN SATURATION: 96 %

## 2025-07-17 DIAGNOSIS — F41.9 ANXIETY: Primary | ICD-10-CM

## 2025-07-17 PROCEDURE — 99214 OFFICE O/P EST MOD 30 MIN: CPT | Performed by: NURSE PRACTITIONER

## 2025-07-17 PROCEDURE — 1036F TOBACCO NON-USER: CPT | Performed by: NURSE PRACTITIONER

## 2025-07-17 RX ORDER — ESCITALOPRAM OXALATE 10 MG/1
10 TABLET ORAL DAILY
Qty: 30 TABLET | Refills: 5 | Status: SHIPPED | OUTPATIENT
Start: 2025-07-17 | End: 2026-01-13

## 2025-07-17 ASSESSMENT — ENCOUNTER SYMPTOMS: DEPRESSION: 0

## 2025-07-17 NOTE — PROGRESS NOTES
Subjective   Patient ID: Sunitha Mckeon is a 29 y.o. female who presents for Follow-up (2 week f/u).    HPI   Pleasant newly established 27 y/o female with PMH Heart Murmur, Anxiety, here for follow up     Anxiety- At last visit started Lexapro. Reports today feels the medication is working well and would like to continue. Has spoken to Psychology and was provided resources for counseling, recommended she establish with someone to help deal with possible upcoming procedures. Denies SI, HI, sleeping well.     At last visit noted In the past able to manage with CBT such as deep breathing, distraction.  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 PHQ-P Score 6, EVARISTO 7 Score 15     She is currently following with Interventional Cardiology Dr Gonzales for ASD/PFO closure which is increasing her anxiety. Her Cardiologist is Dr Ramicone. Cardiac MRI scheduled  Per Cardiology note  After going to the ED for an episode of chest tightness and SOB, EKG revealed anterior T waves abnormality. TTE was then performed and showed a large intracardiac right to left shunt on bubble study with moderate enlargement of her RV. LVEF was normal.    4/24/2025 ( the bubbles appeared to fill the LV from the pulmonary veins rather than through the atrial septum. The EBONI ruled out a primum or sendum ASD and sinus venosus ASD. The flow in from the pulmonary veins appears to be increased ( increased velocity and some turbulence) from unclear cause. Recommend cardiac MRI to further assess. ( unclear if there could be partial anomalous pulmonary veins). The right atrium is severely dilated. A bubble study using agitated saline was performed. Bubble study is positive.     Review of Systems  Review of Systems   Constitutional: Negative.    HENT: Negative.     Cardiac: Negative  Psychiatric/Behavioral: HPI     All other systems  reviewed and are negative.    Objective   /75 (BP Location: Left arm, Patient Position: Sitting)   Pulse 72   Temp 36.7 °C (98.1 °F)   Wt 80.7 kg (178 lb)   LMP 07/03/2025   SpO2 96%   Breastfeeding No   BMI 28.73 kg/m²     Physical Exam  Vitals reviewed.   Constitutional:       Appearance: Normal appearance.     Cardiovascular:      Rate and Rhythm: Normal rate and regular rhythm.   Pulmonary:      Effort: Pulmonary effort is normal.      Breath sounds: Normal breath sounds.     Neurological:      General: No focal deficit present.      Mental Status: She is alert.     Psychiatric:         Mood and Affect: Mood normal.         Assessment/Plan   Problem List Items Addressed This Visit           ICD-10-CM    Anxiety - Primary F41.9    Stable, plan to continue Lexapro  In process of finding Counselor, Psychology provided her with resources           Relevant Medications    escitalopram (Lexapro) 10 mg tablet

## 2025-07-17 NOTE — ASSESSMENT & PLAN NOTE
Stable, plan to continue Lexapro  In process of finding Counselor, Psychology provided her with resources

## 2025-07-30 ENCOUNTER — HOSPITAL ENCOUNTER (OUTPATIENT)
Dept: RADIOLOGY | Facility: HOSPITAL | Age: 30
Discharge: HOME | End: 2025-07-30
Payer: COMMERCIAL

## 2025-07-30 VITALS — BODY MASS INDEX: 28.34 KG/M2 | HEIGHT: 66 IN | WEIGHT: 176.37 LBS

## 2025-07-30 DIAGNOSIS — Q21.12 PFO (PATENT FORAMEN OVALE) (HHS-HCC): ICD-10-CM

## 2025-07-30 PROCEDURE — A9575 INJ GADOTERATE MEGLUMI 0.1ML: HCPCS | Performed by: INTERNAL MEDICINE

## 2025-07-30 PROCEDURE — 75561 CARDIAC MRI FOR MORPH W/DYE: CPT | Performed by: INTERNAL MEDICINE

## 2025-07-30 PROCEDURE — 2550000001 HC RX 255 CONTRASTS: Performed by: INTERNAL MEDICINE

## 2025-07-30 PROCEDURE — 71555 MRI ANGIO CHEST W OR W/O DYE: CPT | Performed by: INTERNAL MEDICINE

## 2025-07-30 PROCEDURE — 75565 CARD MRI VELOC FLOW MAPPING: CPT

## 2025-07-30 RX ORDER — GADOTERATE MEGLUMINE 376.9 MG/ML
32 INJECTION INTRAVENOUS
Status: COMPLETED | OUTPATIENT
Start: 2025-07-30 | End: 2025-07-30

## 2025-07-30 RX ADMIN — GADOTERATE MEGLUMINE 32 ML: 376.9 INJECTION INTRAVENOUS at 14:25

## 2025-07-31 ENCOUNTER — TELEMEDICINE (OUTPATIENT)
Dept: CARDIOLOGY | Facility: CLINIC | Age: 30
End: 2025-07-31
Payer: COMMERCIAL

## 2025-07-31 DIAGNOSIS — I51.7 ENLARGED RV (RIGHT VENTRICLE): ICD-10-CM

## 2025-07-31 DIAGNOSIS — Q21.12 PFO (PATENT FORAMEN OVALE) (HHS-HCC): Primary | ICD-10-CM

## 2025-07-31 DIAGNOSIS — Q21.16: Primary | ICD-10-CM

## 2025-07-31 DIAGNOSIS — Q26.3 PARTIAL ANOMALOUS PULMONARY VENOUS RETURN (HHS-HCC): ICD-10-CM

## 2025-07-31 DIAGNOSIS — I51.7 RIGHT HEART ENLARGEMENT: ICD-10-CM

## 2025-07-31 PROCEDURE — 99215 OFFICE O/P EST HI 40 MIN: CPT | Performed by: INTERNAL MEDICINE

## 2025-07-31 NOTE — PROGRESS NOTES
Virtual visit : Total time spent 80 minutes including chart preparation, discussion with imaging cardiology (Antonio), congenital cardiology (Torres) and face to face encounter.     PCP:  JUAN Poon  Cards: Dr. Ramicone     This is a 29 y.o health female with no significant PMH. After going to the ED for an episode of chest tightness and SOB, EKG revealed anterior T waves abnormality. TTE was then performed and showed a large intracardiac right to left shunt on bubble study with moderate enlargement of her RV. LVEF was normal.      She was thus initially referred to our clinical for ASD/PFO closure evaluation.     She reports occasional episodes of shortness of breath not associated with exercise. She has noticed some fatiguability with resuming exercise after childbirth that she has attributed to deconditioning. She has not had any leg edema or unusual weight gain. No orthopnea or PND. No palpitations, presyncope,or syncope. She has had 2 uneventful pregnancies and is considering a third.    As part of her workup she underwent a EBONI which revealed a small PFO with severely dilated RV. 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 We had an extensive discussion including drawings to describe the defect and also discussed the possible solutions. All questions and concerns were addressed.    ROS:     Constitutional: not feeling tired, not feeling poorly, no fever and no chills.   Eyes: no eyesight problems, no blurred vision, no diplopia, no eye pain, no purulent discharge from the eyes, eyes not red, no dryness of the eyes and no itching of the eyes.   ENT: no nosebleeds, no hearing loss, no tinnitus, no earache, no sore throat, no hoarseness, no swollen glands in the neck and no nasal discharge.   Cardiovascular: no intermittent leg claudication, no chest pain, no tightness or heavy pressure, no shortness of breath, no palpitations, no lower extremity  edema, the heart rate was not slow, the heart rate was not fast and as noted in HPI.   Respiratory: no chronic cough, not coughing up sputum,  no wheezing that is consistent with asthma, no asthma, no orthopnea and no postural nocturnal dyspnea.   Gastrointestinal: no change in bowel habits, no blood in stools, no diarrhea, no constipation, no nausea, no vomiting, no abdominal pain, no signs and symptoms of ulcer disease, no eric colored stools and no intolerance to fatty foods.   Genitourinary: no hematuria,  no urinary frequency, no dysuria, no incontinence, no burning sensation during urination, no urinary hesitancy, no nocturia, no genital lesion, no testicular pain, urinary stream is not smaller and urinary stream does not start and stop.   Musculoskeletal: no arthralgias, no myalgias, no joint swelling, no joint stiffness, no muscle weakness, no back pain, no limb pain, no limb swelling and no difficulty walking.   Skin: no skin rashes, no change in skin color and pigmentation, no skin lesions and no skin lumps.   Neurological: no seizures, no frequent falls, no headaches, no dizziness, no tingling, no fainting and no limb weakness.   Psychiatric: no depression, not suicidal, no confusion, no memory lapses or loss, no anxiety, no personality change and no emotional problems.   Endocrine: no goiter, no thyroid disorder, no diabetes mellitus, no excessive thirst, no dry skin, no cold intolerance, no heat intolerance, no erectile dysfunction, no increased urinary frequency, no proptosis and no deepening of the voice.   Hematologic/Lymphatic: no bleeding issues.   All other systems have been reviewed and are negative for complaint.     Physical Exam: virtual encounter      Labs:  Results for orders placed or performed during the hospital encounter of 06/24/25   Transesophageal echo (EBONI)    Collection Time: 06/24/25 10:21 AM   Result Value Ref Range    LV EF 63 %      I personally reviewed the EBONI and cardiac MRI  images and discussed them with Dr. Alvarez. EBONI confirms a small PFO with severe RV dilatation. MRI confirms PAPVR and sinus venosus defect with severe RV dilatation.    Meds:    Current Outpatient Medications   Medication Instructions    escitalopram (LEXAPRO) 10 mg, oral, Daily    prenatal vit 91/iron/folic/dha (PRENATAL + DHA ORAL) Take by mouth.        Assessment/Plan:    This is a 29 y.o health female with no significant PMH. There is mildly reduced right ventricular systolic function. Severely enlarged right ventricle. The EBONI ruled out primum or secundum ASD. Due to the severity of RV dilatation, we obtained a cardiac MRI which we have reviewed with Dr. Alvarez.     Patient has a confirmed diagnosis of PAPVR and sinus venosus defect on her cardiac MRI. We will refer her to Dr. Pancho Macdonald for further evaluation and consideration of percutaneous vs surgical repair.     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  I still think that there would be value in talking with Dr. Macdonald for his expert opinion.  thus I encouraged her for this clinic appointment and we will schedule it for her.   If she chooses to ultimately get treated at another hospital then Dr. Macdonald can assist with recommendations.    I saw and evaluated the patient. I personally obtained the key and critical portions of the history and physical exam or was physically present for key and critical portions performed by the resident/fellow. I reviewed the resident/fellow's documentation and discussed the patient with the resident/fellow. I agree with the resident/fellow's medical decision making as documented in the note.    Edvin Gonzales MD  , Interventional Cardiology Fellowship Program   [facility]   [facility] [employer]  Office Phone Number: [phone]

## 2025-08-06 ENCOUNTER — TELEPHONE (OUTPATIENT)
Dept: PEDIATRIC CARDIOLOGY | Facility: HOSPITAL | Age: 30
End: 2025-08-06
Payer: COMMERCIAL

## 2025-08-06 NOTE — TELEPHONE ENCOUNTER
08/06/25 at 4:37 PM     Spoke with: Patient   [phone]     Discussed options for procedure to be done at , encouraged reaching out if she had any questions or if there was anything else needed.     - Annmarie Henry, RN  [facility] RN Patient Navigator  Pediatric Cardiology  [phone]

## 2025-08-14 ENCOUNTER — APPOINTMENT (OUTPATIENT)
Dept: RADIOLOGY | Facility: HOSPITAL | Age: 30
End: 2025-08-14
Payer: COMMERCIAL

## 2025-09-15 ENCOUNTER — APPOINTMENT (OUTPATIENT)
Dept: PRIMARY CARE | Facility: CLINIC | Age: 30
End: 2025-09-15
Payer: COMMERCIAL

## 2025-12-17 ENCOUNTER — APPOINTMENT (OUTPATIENT)
Dept: OBSTETRICS AND GYNECOLOGY | Facility: CLINIC | Age: 30
End: 2025-12-17
Payer: COMMERCIAL

## 2026-01-08 ENCOUNTER — APPOINTMENT (OUTPATIENT)
Dept: PRIMARY CARE | Facility: CLINIC | Age: 31
End: 2026-01-08
Payer: COMMERCIAL

## 2026-03-18 ENCOUNTER — APPOINTMENT (OUTPATIENT)
Dept: CARDIOLOGY | Facility: CLINIC | Age: 31
End: 2026-03-18
Payer: COMMERCIAL